# Patient Record
Sex: MALE | Race: WHITE | Employment: OTHER | RURAL
[De-identification: names, ages, dates, MRNs, and addresses within clinical notes are randomized per-mention and may not be internally consistent; named-entity substitution may affect disease eponyms.]

---

## 2017-08-18 DIAGNOSIS — J01.01 ACUTE RECURRENT MAXILLARY SINUSITIS: Primary | ICD-10-CM

## 2017-08-18 RX ORDER — SULFAMETHOXAZOLE AND TRIMETHOPRIM 800; 160 MG/1; MG/1
1 TABLET ORAL 2 TIMES DAILY
Qty: 20 TAB | Refills: 0 | Status: SHIPPED | OUTPATIENT
Start: 2017-08-18 | End: 2017-08-28

## 2017-08-18 NOTE — PROGRESS NOTES
On Call Note:    75M with PCN allergy, h/o recurrent sinusitis who calls tonight with onset of worsening sinus pressure, congestion and facial pain for the past few days. He is seen routinely at the [address] [place] [address] and has not been seen in our office for the past 18-months. We discussed that he really should be seen prior to prescribing antibiotics. However, it is storming outside and due to his insurance coverage, they cannot be seen at an urgent care without some great degree of financial strain. In the past, the antibiotic that works best for him has been Bactrim. Z-clinton and Erythromycin were not helpful. I discussed with the patient that ideally, he should be seen his PCP - Dr. Rush Nicole, every 6-12 months and should try to make sure that his records (at least some of them) are sent to our office. I stressed that if his symptoms were not better in 24-48 hours, he would need to be seen. Antibiotic changes without evaluation would not be possible. A/P:    Recurrent Sinusitis:  1. Please take the Bactrim DS 1 tablet 2 x daily x 10 day. 2. Please use the FLONASE (nasal spray) 2 squirts in each nostril just 1 x per day. 3. Please use NORMAL SALINE nasal spray (buy this over the counter) use 2-3 squirts in each nostril every 1-2 hours while awake. This will help to really clear up and move the secretions down. I have discussed the diagnosis with the patient and the intended treatment plan as seen in the above orders. Asked to make an appointment to see his PCP as soon as possible. Return should symptoms worsen or not improve with treatment. Pt and his wife verbalized understanding of plan of care and denies further questions or concerns at this time.      Nilsa Chan MD

## 2017-10-02 ENCOUNTER — OFFICE VISIT (OUTPATIENT)
Dept: FAMILY MEDICINE CLINIC | Age: 77
End: 2017-10-02

## 2017-10-02 VITALS
TEMPERATURE: 98.1 F | OXYGEN SATURATION: 97 % | SYSTOLIC BLOOD PRESSURE: 112 MMHG | HEIGHT: 69 IN | HEART RATE: 57 BPM | RESPIRATION RATE: 20 BRPM | DIASTOLIC BLOOD PRESSURE: 64 MMHG | WEIGHT: 205.6 LBS | BODY MASS INDEX: 30.45 KG/M2

## 2017-10-02 DIAGNOSIS — Z91.09 ENVIRONMENTAL ALLERGIES: ICD-10-CM

## 2017-10-02 DIAGNOSIS — I10 ESSENTIAL HYPERTENSION, BENIGN: ICD-10-CM

## 2017-10-02 DIAGNOSIS — T46.4X5A COUGH DUE TO ACE INHIBITOR: Primary | ICD-10-CM

## 2017-10-02 DIAGNOSIS — R05.8 COUGH DUE TO ACE INHIBITOR: Primary | ICD-10-CM

## 2017-10-02 PROBLEM — C44.91 BASAL CELL CARCINOMA: Status: ACTIVE | Noted: 2017-10-02

## 2017-10-02 PROBLEM — C43.30 MALIGNANT MELANOMA OF FACE (HCC): Status: ACTIVE | Noted: 2017-10-02

## 2017-10-02 RX ORDER — LATANOPROST 50 UG/ML
1 SOLUTION/ DROPS OPHTHALMIC
COMMUNITY

## 2017-10-02 RX ORDER — OMEPRAZOLE 20 MG/1
20 CAPSULE, DELAYED RELEASE ORAL 2 TIMES DAILY
COMMUNITY
End: 2021-12-15 | Stop reason: ALTCHOICE

## 2017-10-02 RX ORDER — LEVOTHYROXINE SODIUM 112 UG/1
TABLET ORAL
COMMUNITY

## 2017-10-02 RX ORDER — BISMUTH SUBSALICYLATE 262 MG
1 TABLET,CHEWABLE ORAL DAILY
COMMUNITY
End: 2021-12-15 | Stop reason: ALTCHOICE

## 2017-10-02 RX ORDER — KETOCONAZOLE 20 MG/ML
SHAMPOO TOPICAL DAILY PRN
COMMUNITY
End: 2019-11-01

## 2017-10-02 RX ORDER — LOSARTAN POTASSIUM 100 MG/1
100 TABLET ORAL DAILY
Qty: 30 TAB | Refills: 3 | Status: SHIPPED | OUTPATIENT
Start: 2017-10-02 | End: 2019-11-01

## 2017-10-02 RX ORDER — CALCIUM CARBONATE 500(1250)
TABLET ORAL DAILY
COMMUNITY

## 2017-10-02 RX ORDER — LORATADINE 10 MG/1
10 TABLET ORAL DAILY
Qty: 30 TAB | Refills: 3 | Status: SHIPPED | OUTPATIENT
Start: 2017-10-02 | End: 2018-09-25

## 2017-10-02 RX ORDER — TRIAMCINOLONE ACETONIDE 1 MG/G
CREAM TOPICAL 2 TIMES DAILY
COMMUNITY

## 2017-10-02 NOTE — PATIENT INSTRUCTIONS
Losartan (By mouth)   Losartan (julia-LIS-tan)  Treats high blood pressure. Reduces the risk of stroke in patients with high blood pressure and an enlarged heart. Treats kidney disease in patients with diabetes. This medicine is an angiotensin receptor blocker (ARB). Brand Name(s): Cozaar   There may be other brand names for this medicine. When This Medicine Should Not Be Used: This medicine is not right for everyone. Do not use it if you had an allergic reaction to losartan, or if you are pregnant. Do not use this medicine together with aliskiren if you have diabetes. How to Use This Medicine:   Tablet  · Take your medicine as directed. Your dose may need to be changed several times to find what works best for you. · Drink plenty of fluids if you exercise, sweat more than usual, or have diarrhea or vomiting. · Read and follow the patient instructions that come with this medicine. Talk to your doctor or pharmacist if you have any questions. · Missed dose: Take a dose as soon as you remember. If it is almost time for your next dose, wait until then and take a regular dose. Do not take extra medicine to make up for a missed dose. · Store the medicine in a closed container at room temperature, away from heat, moisture, and direct light. Drugs and Foods to Avoid:   Ask your doctor or pharmacist before using any other medicine, including over-the-counter medicines, vitamins, and herbal products. · Some medicines can affect how losartan works. Tell your doctor if you are using any of the following:   ¨ Lithium  ¨ Rifampin  ¨ A diuretic (water pill), such as spironolactone, triamterene, or amiloride  ¨ NSAID pain or arthritis medicine, such as aspirin, diclofenac, ibuprofen, or naproxen  ¨ Another blood pressure medicine, such as aliskiren, benazepril, enalapril, or lisinopril  · Ask your doctor before you use any medicine, supplement, or salt substitute that contains potassium.   Warnings While Using This Medicine:   · It is not safe to take this medicine during pregnancy. It could harm an unborn baby. Tell your doctor right away if you become pregnant. · Tell your doctor if you are breastfeeding, or if you have kidney disease, liver disease, congestive heart failure, or diabetes. Tell your doctor if you have had angioedema that was caused by a blood pressure medicine. · This medicine could lower your blood pressure too much, especially when you first use it or if you are dehydrated. Stand or sit up slowly if you feel lightheaded or dizzy. · Your doctor will do lab tests at regular visits to check on the effects of this medicine. Keep all appointments. · Keep all medicine out of the reach of children. Never share your medicine with anyone. Possible Side Effects While Using This Medicine:   Call your doctor right away if you notice any of these side effects:  · Allergic reaction: Itching or hives, swelling in your face or hands, swelling or tingling in your mouth or throat, chest tightness, trouble breathing  · Change in how much or how often you urinate  · Confusion, weakness, uneven heartbeat, trouble breathing, numbness or tingling in your hands, feet, or lips  · Lightheadedness, dizziness, fainting  · Rapid weight gain, swelling in your hands, ankles, or feet  If you notice these less serious side effects, talk with your doctor:   · Diarrhea  · Tiredness  If you notice other side effects that you think are caused by this medicine, tell your doctor. Call your doctor for medical advice about side effects. You may report side effects to FDA at 2-664-FDA-6471  © 2017 [facility] Information is for End User's use only and may not be sold, redistributed or otherwise used for commercial purposes. The above information is an  only. It is not intended as medical advice for individual conditions or treatments.  Talk to your doctor, nurse or pharmacist before following any medical regimen to see if it is safe and effective for you. Stop Lisinopril due to chronic dry cough. Switched Losartan 100 mg daily for blood pressure control. Monitor BP at home. Let MD at [facility] know of medicine change.

## 2017-10-02 NOTE — MR AVS SNAPSHOT
Visit Information Date & Time Provider Department Dept. Phone Encounter #  
 38/3/3201 56:26 AM Zuleyma Avalos Jaun Kalia [phone] [id number] Upcoming Health Maintenance Date Due  
 MEDICARE YEARLY EXAM 1/15/2016 GLAUCOMA SCREENING Q2Y 10/2/2019 Pneumococcal 65+ Low/Medium Risk (2 of 2 - PPSV23) 2/16/2020 COLONOSCOPY 2/24/2025 DTaP/Tdap/Td series (2 - Td) 10/2/2027 Allergies as of 10/2/2017  Review Complete On: 19/3/0704 By: Zuleyma Avalos MD  
  
 Severity Noted Reaction Type Reactions Avelox [Moxifloxacin]  09/02/2009    Rash Codeine  09/02/2009    Rash Darvocet A500 [Propoxyphene N-acetaminophen]  09/02/2009    Rash Fluoxetine  09/02/2009    Unknown (comments) Ibuprin  09/02/2009    Unknown (comments) Pcn [Penicillins]  09/02/2009    Rash Prednisone  05/07/2010    Other (comments) Causes eye blood vessels to bleed Tamsulosin  09/02/2009    Rash Current Immunizations  Reviewed on 2/24/2015 Name Date Pneumococcal Vaccine (Unspecified Type) 2/16/2015 Not reviewed this visit You Were Diagnosed With   
  
 Codes Comments Cough due to ACE inhibitor    -  Primary ICD-10-CM: R05, T46.4X5A 
ICD-9-CM: 786.2, E942.6 Environmental allergies     ICD-10-CM: Z91.09 
ICD-9-CM: V15.09 Essential hypertension, benign     ICD-10-CM: I10 
ICD-9-CM: 401.1 Vitals BP Pulse Temp Resp Height(growth percentile) Weight(growth percentile) 112/64 (BP 1 Location: Left arm, BP Patient Position: Sitting) (!) 57 98.1 °F (36.7 °C) (Oral) 20 5' 9\" (1.753 m) 205 lb 9.6 oz (93.3 kg) SpO2 BMI Smoking Status 97% 30.36 kg/m2 Never Smoker BMI and BSA Data Body Mass Index Body Surface Area  
 30.36 kg/m 2 2.13 m 2 Preferred Pharmacy Pharmacy Name Phone [facility]/PHARMACY #[id number] - [place] - [address] [phone] Your Updated Medication List  
  
   
 This list is accurate as of: 10/2/17 12:27 PM.  Always use your most recent med list.  
  
  
  
  
 aspirin 81 mg tablet Take 81 mg by mouth. AYR SALINE 0.65 % nasal spray Generic drug:  sodium chloride 1 Spray as needed for Congestion. brimonidine 0.2 % ophthalmic solution Commonly known as:  Rakesh Haven Administer 1 Drop to both eyes three (3) times daily. calcium carbonate 500 mg calcium (1,250 mg) tablet Commonly known as:  OS-ELAN Take  by mouth daily. dorzolamide 2 % ophthalmic solution Commonly known as:  TRUSOPT Administer 1 Drop to both eyes two (2) times a day. hydroCHLOROthiazide 25 mg tablet Commonly known as:  HYDRODIURIL Take 25 mg by mouth daily. ketoconazole 2 % shampoo Commonly known as:  NIZORAL Apply  to affected area daily as needed for Itching. latanoprost 0.005 % ophthalmic solution Commonly known as:  Indy Prabhakar Administer 1 Drop to both eyes nightly. loratadine 10 mg tablet Commonly known as:  Jami Draft Take 1 Tab by mouth daily. Indications: ALLERGIC RHINITIS  
  
 losartan 100 mg tablet Commonly known as:  COZAAR Take 1 Tab by mouth daily. Indications: hypertension LUTEIN PO Take  by mouth.  
  
 multivitamin tablet Commonly known as:  ONE A DAY Take 1 Tab by mouth daily. PriLOSEC 20 mg capsule Generic drug:  omeprazole Take 20 mg by mouth two (2) times a day. SYNTHROID 112 mcg tablet Generic drug:  levothyroxine Take  by mouth Daily (before breakfast). TERAZOSIN PO Take 10 mg by mouth daily. triamcinolone acetonide 0.1 % topical cream  
Commonly known as:  KENALOG Apply  to affected area two (2) times a day. use thin layer VITAMIN C PO Take 3,000 mg by mouth daily. VITAMIN D3 1,000 unit tablet Generic drug:  cholecalciferol Take 3,000 Units by mouth daily. Prescriptions Sent to Pharmacy Refills loratadine (CLARITIN) 10 mg tablet 3 Sig: Take 1 Tab by mouth daily. Indications: ALLERGIC RHINITIS Class: Normal  
 Pharmacy: [facility]/pharmacy #[id number] - [place], [place] - [address]. Ph #: [phone] Route: Oral  
 losartan (COZAAR) 100 mg tablet 3 Sig: Take 1 Tab by mouth daily. Indications: hypertension Class: Normal  
 [id number] [place], [place] - [address]. Ph #: [phone] Route: Oral  
  
Patient Instructions Losartan (By mouth) Losartan (julia-LIS-tan) Treats high blood pressure. Reduces the risk of stroke in patients with high blood pressure and an enlarged heart. Treats kidney disease in patients with diabetes. This medicine is an angiotensin receptor blocker (ARB). Brand Name(s): Cozaar There may be other brand names for this medicine. When This Medicine Should Not Be Used: This medicine is not right for everyone. Do not use it if you had an allergic reaction to losartan, or if you are pregnant. Do not use this medicine together with aliskiren if you have diabetes. How to Use This Medicine:  
Tablet · Take your medicine as directed. Your dose may need to be changed several times to find what works best for you. · Drink plenty of fluids if you exercise, sweat more than usual, or have diarrhea or vomiting. · Read and follow the patient instructions that come with this medicine. Talk to your doctor or pharmacist if you have any questions. · Missed dose: Take a dose as soon as you remember. If it is almost time for your next dose, wait until then and take a regular dose. Do not take extra medicine to make up for a missed dose. · Store the medicine in a closed container at room temperature, away from heat, moisture, and direct light. Drugs and Foods to Avoid: Ask your doctor or pharmacist before using any other medicine, including over-the-counter medicines, vitamins, and herbal products. · Some medicines can affect how losartan works. Tell your doctor if you are using any of the following: ¨ Lithium ¨ Rifampin ¨ A diuretic (water pill), such as spironolactone, triamterene, or amiloride ¨ NSAID pain or arthritis medicine, such as aspirin, diclofenac, ibuprofen, or naproxen ¨ Another blood pressure medicine, such as aliskiren, benazepril, enalapril, or lisinopril · Ask your doctor before you use any medicine, supplement, or salt substitute that contains potassium. Warnings While Using This Medicine: · It is not safe to take this medicine during pregnancy. It could harm an unborn baby. Tell your doctor right away if you become pregnant. · Tell your doctor if you are breastfeeding, or if you have kidney disease, liver disease, congestive heart failure, or diabetes. Tell your doctor if you have had angioedema that was caused by a blood pressure medicine. · This medicine could lower your blood pressure too much, especially when you first use it or if you are dehydrated. Stand or sit up slowly if you feel lightheaded or dizzy. · Your doctor will do lab tests at regular visits to check on the effects of this medicine. Keep all appointments. · Keep all medicine out of the reach of children. Never share your medicine with anyone. Possible Side Effects While Using This Medicine:  
Call your doctor right away if you notice any of these side effects: · Allergic reaction: Itching or hives, swelling in your face or hands, swelling or tingling in your mouth or throat, chest tightness, trouble breathing · Change in how much or how often you urinate · Confusion, weakness, uneven heartbeat, trouble breathing, numbness or tingling in your hands, feet, or lips · Lightheadedness, dizziness, fainting · Rapid weight gain, swelling in your hands, ankles, or feet If you notice these less serious side effects, talk with your doctor: · Diarrhea · Tiredness If you notice other side effects that you think are caused by this medicine, tell your doctor. Call your doctor for medical advice about side effects. You may report side effects to FDA at 0-373-JYO-9439 © 2017 [employer] Information is for End User's use only and may not be sold, redistributed or otherwise used for commercial purposes. The above information is an  only. It is not intended as medical advice for individual conditions or treatments. Talk to your doctor, nurse or pharmacist before following any medical regimen to see if it is safe and effective for you. Stop Lisinopril due to chronic dry cough. Switched Losartan 100 mg daily for blood pressure control. Monitor BP at home. Let MD at [facility] know of medicine change. Introducing [facility]! [facility] introduces Joy[employer] patient portal. Now you can access parts of your medical record, email your doctor's office, and request medication refills online. 1. In your internet browser, go to [email or web address] 2. Click on the First Time User? Click Here link in the Sign In box. You will see the New Member Sign Up page. 3. Enter your Joyus Access Code exactly as it appears below. You will not need to use this code after youve completed the sign-up process. If you do not sign up before the expiration date, you must request a new code. · Joyus Access Code: [email or web address] Expires: 12/31/2017 12:27 PM 
 
4. Enter the last four digits of your Social Security Number (xxxx) and Date of Birth (mm/dd/yyyy) as indicated and click Submit. You will be taken to the next sign-up page. 5. Create a Joyus ID. This will be your Joyus login ID and cannot be changed, so think of one that is secure and easy to remember. 6. Create a Joyus password. You can change your password at any time. 7. Enter your Password Reset Question and Answer.  This can be used at a later time if you forget your password. 8. Enter your e-mail address. You will receive e-mail notification when new information is available in [address] 9. Click Sign Up. You can now view and download portions of your medical record. 10. Click the Download Summary menu link to download a portable copy of your medical information. If you have questions, please visit the Frequently Asked Questions section of the [employer] website. Remember, [employer] is NOT to be used for urgent needs. For medical emergencies, dial 911. Now available from your iPhone and Android! Please provide this summary of care documentation to your next provider. Your primary care clinician is listed as Cyndee Bucio. If you have any questions after today's visit, please call [phone].

## 2017-10-02 NOTE — PROGRESS NOTES
Identified pt with two pt identifiers(name and ). Chief Complaint   Patient presents with    Cold Symptoms     has been on 3 antibiotics - still has cough - morning he feels good but by evening he startes going down [place]         Health Maintenance Due   Topic    MEDICARE YEARLY EXAM        Wt Readings from Last 3 Encounters:   10/02/17 205 lb 9.6 oz (93.3 kg)   12/18/15 212 lb 9.6 oz (96.4 kg)   12/10/15 212 lb (96.2 kg)     Temp Readings from Last 3 Encounters:   10/02/17 98.1 °F (36.7 °C) (Oral)   12/18/15 98.2 °F (36.8 °C) (Oral)   12/10/15 98.8 °F (37.1 °C) (Oral)     BP Readings from Last 3 Encounters:   10/02/17 112/64   12/18/15 111/55   12/10/15 92/62     Pulse Readings from Last 3 Encounters:   10/02/17 (!) 57   12/18/15 (!) 58   12/10/15 (!) 58         Learning Assessment:  :     Learning Assessment 3/11/2015   PRIMARY LEARNER Patient   HIGHEST LEVEL OF EDUCATION - PRIMARY LEARNER  > 4 YEARS OF COLLEGE   BARRIERS PRIMARY LEARNER VISUAL   CO-LEARNER CAREGIVER No   PRIMARY LANGUAGE ENGLISH   LEARNER PREFERENCE PRIMARY LISTENING   ANSWERED BY self   RELATIONSHIP SELF       Depression Screening:  :     PHQ over the last two weeks 10/2/2017   Little interest or pleasure in doing things Not at all   Feeling down, depressed or hopeless Not at all   Total Score PHQ 2 0       Fall Risk Assessment:  :     Fall Risk Assessment, last 12 mths 10/2/2017   Able to walk? Yes   Fall in past 12 months? Yes   Fall with injury? No   Number of falls in past 12 months 1   Fall Risk Score 1       Abuse Screening:  :     Abuse Screening Questionnaire 10/2/2017 3/11/2015   Do you ever feel afraid of your partner? N N   Are you in a relationship with someone who physically or mentally threatens you? N N   Is it safe for you to go home? Y Y       Coordination of Care Questionnaire:  :     1) Have you been to an emergency room, urgent care clinic since your last visit?  yes [facility]   Hospitalized since your last visit? no 2) Have you seen or consulted any other health care providers outside of [address] [place] [address] since your [place]? [place]  (Include any pap smears or colon screenings in this section.)    3) Do you have an Advance Directive on file? yes  Are you interested in receiving information about Advance Directives? no    Patient is accompanied by spouse I have received verbal consent from Kourtney Mendoza to discuss any/all medical information while they are present in the room. Reviewed record in preparation for visit and have obtained necessary documentation. Medication reconciliation up to date and corrected with patient at this time.

## 2017-10-02 NOTE — PROGRESS NOTES
HISTORY OF PRESENT ILLNESS  Rachel Larose is a 69 y.o. male. HPI  C/O counh since August. He has taken 3 courses of ABC's: Bactrim DS x 2 and DOXY x 10 days. Dry , tickle cough that starts in throat area. He does admit to nasal drip. Denies fever, wheeze, sinus congestion. Takes Benadryl once a day. He was last seen in 2015 because he gets medical care thru [facility] ([facility]) AT [place].    Review of Systems   Constitutional: Negative for fever. HENT: Positive for congestion. Respiratory: Positive for cough. Negative for sputum production, shortness of breath and wheezing. All other systems reviewed and are negative. Current Outpatient Prescriptions on File Prior to Visit   Medication Sig Dispense Refill    sodium chloride (AYR SALINE) 0.65 % nasal spray 1 Spray as needed for Congestion.  cholecalciferol (VITAMIN D3) 1,000 unit tablet Take 3,000 Units by mouth daily.  hydrochlorothiazide (HYDRODIURIL) 25 mg tablet Take 25 mg by mouth daily.  aspirin 81 mg tablet Take 81 mg by mouth.  dorzolamide (TRUSOPT) 2 % ophthalmic solution Administer 1 Drop to both eyes two (2) times a day.  brimonidine (ALPHAGAN) 0.2 % ophthalmic solution Administer 1 Drop to both eyes three (3) times daily.  ASCORBATE CALCIUM (VITAMIN C PO) Take 3,000 mg by mouth daily.  TERAZOSIN HCL (TERAZOSIN PO) Take 10 mg by mouth daily. No current facility-administered medications on file prior to visit.       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 He appears well-developed and well-nourished. HENT:   Right Ear: External ear normal.   Left Ear: External ear normal.   Nose: Nose normal.   Mouth/Throat: Oropharynx is clear and moist.   Eyes: Conjunctivae are normal.   Neck: Neck supple. Cardiovascular: Normal rate, regular rhythm and normal heart sounds. Pulmonary/Chest: Effort normal and breath sounds normal. No respiratory distress. He has no wheezes. He has no rales. Neurological: He is alert and oriented to person, place, and time. Vitals reviewed. ASSESSMENT and PLAN    ICD-10-CM ICD-9-CM    1. Cough due to ACE inhibitor R05 786.2     T46.4X5A E942.6    2. Environmental allergies Z91.09 V15.09 loratadine (CLARITIN) 10 mg tablet   3. Essential hypertension, benign I10 401.1 losartan (COZAAR) 100 mg tablet     Switch off ACE inhibitor to ARB. Monitor BP. [facility]. Try Loratadine 10 mg instead of Benadryl for better allergy coverage. Pt verbalizes understanding of plan of care and denies further questions or concerns at this time. Patient Instructions   Losartan (By mouth)   Losartan (julia-LIS-tan)  Treats high blood pressure. Reduces the risk of stroke in patients with high blood pressure and an enlarged heart. Treats kidney disease in patients with diabetes. This medicine is an angiotensin receptor blocker (ARB). Brand Name(s): Cozaar   There may be other brand names for this medicine. When This Medicine Should Not Be Used: This medicine is not right for everyone. Do not use it if you had an allergic reaction to losartan, or if you are pregnant. Do not use this medicine together with aliskiren if you have diabetes. How to Use This Medicine:   Tablet  · Take your medicine as directed. Your dose may need to be changed several times to find what works best for you.   · Drink plenty of fluids if you exercise, sweat more than usual, or have diarrhea or vomiting. · Read and follow the patient instructions that come with this medicine. Talk to your doctor or pharmacist if you have any questions. · Missed dose: Take a dose as soon as you remember. If it is almost time for your next dose, wait until then and take a regular dose. Do not take extra medicine to make up for a missed dose. · Store the medicine in a closed container at room temperature, away from heat, moisture, and direct light. Drugs and Foods to Avoid:   Ask your doctor or pharmacist before using any other medicine, including over-the-counter medicines, vitamins, and herbal products. · Some medicines can affect how losartan works. Tell your doctor if you are using any of the following:   ¨ Lithium  ¨ Rifampin  ¨ A diuretic (water pill), such as spironolactone, triamterene, or amiloride  ¨ NSAID pain or arthritis medicine, such as aspirin, diclofenac, ibuprofen, or naproxen  ¨ Another blood pressure medicine, such as aliskiren, benazepril, enalapril, or lisinopril  · Ask your doctor before you use any medicine, supplement, or salt substitute that contains potassium. Warnings While Using This Medicine:   · It is not safe to take this medicine during pregnancy. It could harm an unborn baby. Tell your doctor right away if you become pregnant. · Tell your doctor if you are breastfeeding, or if you have kidney disease, liver disease, congestive heart failure, or diabetes. Tell your doctor if you have had angioedema that was caused by a blood pressure medicine. · This medicine could lower your blood pressure too much, especially when you first use it or if you are dehydrated. Stand or sit up slowly if you feel lightheaded or dizzy. · Your doctor will do lab tests at regular visits to check on the effects of this medicine. Keep all appointments. · Keep all medicine out of the reach of children. Never share your medicine with anyone.   Possible Side Effects While Using This Medicine:   Call your doctor right away if you notice any of these side effects:  · Allergic reaction: Itching or hives, swelling in your face or hands, swelling or tingling in your mouth or throat, chest tightness, trouble breathing  · Change in how much or how often you urinate  · Confusion, weakness, uneven heartbeat, trouble breathing, numbness or tingling in your hands, feet, or lips  · Lightheadedness, dizziness, fainting  · Rapid weight gain, swelling in your hands, ankles, or feet  If you notice these less serious side effects, talk with your doctor:   · Diarrhea  · Tiredness  If you notice other side effects that you think are caused by this medicine, tell your doctor. Call your doctor for medical advice about side effects. You may report side effects to FDA at 7-104-FDA-1535  © 2017 [facility] Information is for End User's use only and may not be sold, redistributed or otherwise used for commercial purposes. The above information is an  only. It is not intended as medical advice for individual conditions or treatments. Talk to your doctor, nurse or pharmacist before following any medical regimen to see if it is safe and effective for you. Stop Lisinopril due to chronic dry cough. Switched Losartan 100 mg daily for blood pressure control. Monitor BP at home. Let MD at [facility] know of medicine change.       He plans to get FLU vaccine at [facility].

## 2018-01-10 ENCOUNTER — DOCUMENTATION ONLY (OUTPATIENT)
Dept: PHARMACY | Age: 78
End: 2018-01-10

## 2018-01-10 ENCOUNTER — TELEPHONE (OUTPATIENT)
Dept: PHARMACY | Age: 78
End: 2018-01-10

## 2018-01-10 NOTE — TELEPHONE ENCOUNTER
Pharmacy Note:  Medication Adherence and Education    Merle Awad is a 69 y.o. male who was called today for a medication adherence check in due to concerns with possible medication non-adherence based on pharmacy claims data. Patient was flagged for possible non-adherence with the following medication: Losartan 100mg. Per pharmacy, patient called pharmacy and informed staff that he was no longer in need of the Losartan and to deactivate prescription in file. Patient was contacted to verify this information. Patient stated that most of his medications are provided by the [facility] and that he has taken himself off of the Losartan. Stated that the [place] doctor is aware of this.      Thank you,  Severiano Ponce, CPhT

## 2018-01-10 NOTE — PROGRESS NOTES
Pharmacy Note:  Medication Adherence and Education    Elif Guthrie is a 69 y.o. male who's pharmacy was called today for a medication adherence check in due to concerns with possible medication non-adherence based on pharmacy claims data. Patient was flagged for possible non-adherence with the following medication: Losartan 100mg. Per pharmacy, it was found that patient has not picked up medication refill since 10/02/17(30 day) supply and patient called pharmacy on 11/25/17 to inform that patient was no longer taking medication. Message was sent to patient's PCP to find out more information about this situation. Last PCP visit:  10/02/17  Next PCP visit:  Not scheduled at this time    Current refill history for Losartan  Last refill: 10/02/17 (30 day supply)    Take Away:  PCP has been notified of this information and will wait to hear respones.      Thank you,  Ailyn Hawkins CPhT

## 2018-09-25 ENCOUNTER — OFFICE VISIT (OUTPATIENT)
Dept: FAMILY MEDICINE CLINIC | Age: 78
End: 2018-09-25

## 2018-09-25 VITALS
SYSTOLIC BLOOD PRESSURE: 110 MMHG | HEART RATE: 63 BPM | RESPIRATION RATE: 20 BRPM | TEMPERATURE: 98.6 F | WEIGHT: 212.8 LBS | BODY MASS INDEX: 31.52 KG/M2 | DIASTOLIC BLOOD PRESSURE: 58 MMHG | OXYGEN SATURATION: 95 % | HEIGHT: 69 IN

## 2018-09-25 DIAGNOSIS — J06.9 URI WITH COUGH AND CONGESTION: Primary | ICD-10-CM

## 2018-09-25 RX ORDER — DOXYCYCLINE 100 MG/1
100 TABLET ORAL 2 TIMES DAILY
Qty: 20 TAB | Refills: 0 | Status: SHIPPED | OUTPATIENT
Start: 2018-09-25 | End: 2018-10-05

## 2018-09-25 NOTE — MR AVS SNAPSHOT
[facility] 
 
 
 [place]  [address] [facility] 
[phone] Patient: Kourtney Mendoza MRN: [id number] ATD:[birth year] Visit Information Date & Time Provider Department Dept. Phone Encounter #  
 9/25/2018  1:30 PM Jaun Alcala [phone] Upcoming Health Maintenance Date Due Shingrix Vaccine Age 50> (1 of 2) 7/3/1991 MEDICARE YEARLY EXAM 3/14/2018 Influenza Age 5 to Adult 8/1/2018 GLAUCOMA SCREENING Q2Y 10/2/2019 Pneumococcal 65+ High/Highest Risk (2 of 2 - PPSV23) 2/16/2020 COLONOSCOPY 2/24/2025 DTaP/Tdap/Td series (2 - Td) 10/2/2027 Allergies as of 9/25/2018  Review Complete On: 9/25/2018 By: Marilin Maradiaga MD  
  
 Severity Noted Reaction Type Reactions Avelox [Moxifloxacin]  09/02/2009    Rash Codeine  09/02/2009    Rash Darvocet A500 [Propoxyphene N-acetaminophen]  09/02/2009    Rash Fluoxetine  09/02/2009    Unknown (comments) Ibuprin  09/02/2009    Unknown (comments) Pcn [Penicillins]  09/02/2009    Rash Prednisone  05/07/2010    Other (comments) Causes eye blood vessels to bleed Tamsulosin  09/02/2009    Rash Current Immunizations  Reviewed on 2/24/2015 Name Date Pneumococcal Vaccine (Unspecified Type) 2/16/2015 Not reviewed this visit You Were Diagnosed With   
  
 Codes Comments URI with cough and congestion    -  Primary ICD-10-CM: J06.9 ICD-9-CM: 465.9 Vitals BP Pulse Temp Resp Height(growth percentile) Weight(growth percentile) 110/58 (BP 1 Location: Left arm, BP Patient Position: Sitting) 63 98.6 °F (37 °C) (Oral) 20 5' 9\" (1.753 m) 212 lb 12.8 oz (96.5 kg) SpO2 BMI Smoking Status 95% 31.43 kg/m2 Never Smoker BMI and BSA Data Body Mass Index Body Surface Area  
 31.43 kg/m 2 2.17 m 2 Preferred Pharmacy Pharmacy Name Phone [facility]/PHARMACY #[id number] [place] [address] [facility] [address] [phone] Your Updated Medication List  
  
   
This list is accurate as of 9/25/18  2:14 PM.  Always use your most recent med list.  
  
  
  
  
 aspirin 81 mg tablet Take 81 mg by mouth. AYR SALINE 0.65 % nasal squeeze bottle Generic drug:  sodium chloride 1 Spray as needed for Congestion. brimonidine 0.2 % ophthalmic solution Commonly known as:  Tamara Guido Administer 1 Drop to both eyes three (3) times daily. calcium carbonate 500 mg calcium (1,250 mg) tablet Commonly known as:  OS-ELAN Take  by mouth daily. dorzolamide 2 % ophthalmic solution Commonly known as:  TRUSOPT Administer 1 Drop to both eyes two (2) times a day. doxycycline 100 mg tablet Commonly known as:  ADOXA Take 1 Tab by mouth two (2) times a day for 10 days. hydroCHLOROthiazide 25 mg tablet Commonly known as:  HYDRODIURIL Take 25 mg by mouth daily. ketoconazole 2 % shampoo Commonly known as:  NIZORAL Apply  to affected area daily as needed for Itching. latanoprost 0.005 % ophthalmic solution Commonly known as:  Sara Hill Administer 1 Drop to both eyes nightly. losartan 100 mg tablet Commonly known as:  COZAAR Take 1 Tab by mouth daily. Indications: hypertension LUTEIN PO Take  by mouth.  
  
 multivitamin tablet Commonly known as:  ONE A DAY Take 1 Tab by mouth daily. PriLOSEC 20 mg capsule Generic drug:  omeprazole Take 20 mg by mouth two (2) times a day. SYNTHROID 112 mcg tablet Generic drug:  levothyroxine Take  by mouth Daily (before breakfast). TERAZOSIN PO Take 10 mg by mouth daily. triamcinolone acetonide 0.1 % topical cream  
Commonly known as:  KENALOG Apply  to affected area two (2) times a day. use thin layer VITAMIN C PO Take 3,000 mg by mouth daily. VITAMIN D3 1,000 unit tablet Generic drug:  cholecalciferol Take 3,000 Units by mouth daily. Prescriptions Sent to Pharmacy Refills  
 doxycycline (ADOXA) 100 mg tablet 0 Sig: Take 1 Tab by mouth two (2) times a day for 10 days. Class: Normal  
 Pharmacy: [facility]/pharmacy [address], [address] [place] [address] #: [phone] Route: Oral  
  
Patient Instructions Upper Respiratory Infection (Cold): Care Instructions Your Care Instructions An upper respiratory infection, or URI, is an infection of the nose, sinuses, or throat. URIs are spread by coughs, sneezes, and direct contact. The common cold is the most frequent kind of URI. The flu and sinus infections are other kinds of URIs. Almost all URIs are caused by viruses. Antibiotics won't cure them. But you can treat most infections with home care. This may include drinking lots of fluids and taking over-the-counter pain medicine. You will probably feel better in 4 to 10 days. The doctor has checked you carefully, but problems can develop later. If you notice any problems or new symptoms, get medical treatment right away. Follow-up care is a key part of your treatment and safety. Be sure to make and go to all appointments, and call your doctor if you are having problems. It's also a good idea to know your test results and keep a list of the medicines you take. How can you care for yourself at home? · To prevent dehydration, drink plenty of fluids, enough so that your urine is light yellow or clear like water. Choose water and other caffeine-free clear liquids until you feel better. If you have kidney, heart, or liver disease and have to limit fluids, talk with your doctor before you increase the amount of fluids you drink. · Take an over-the-counter pain medicine, such as acetaminophen (Tylenol), ibuprofen (Advil, Motrin), or naproxen (Aleve). Read and follow all instructions on the label. · Before you use cough and cold medicines, check the label.  These medicines may not be safe for young children or for people with certain health problems. · Be careful when taking over-the-counter cold or flu medicines and Tylenol at the same time. Many of these medicines have acetaminophen, which is Tylenol. Read the labels to make sure that you are not taking more than the recommended dose. Too much acetaminophen (Tylenol) can be harmful. · Get plenty of rest. 
· Do not smoke or allow others to smoke around you. If you need help quitting, talk to your doctor about stop-smoking programs and medicines. These can increase your chances of quitting for good. When should you call for help? Call 911 anytime you think you may need emergency care. For example, call if: 
  · You have severe trouble breathing.  
 Call your doctor now or seek immediate medical care if: 
  · You seem to be getting much sicker.  
  · You have new or worse trouble breathing.  
  · You have a new or higher fever.  
  · You have a new rash.  
 Watch closely for changes in your health, and be sure to contact your doctor if: 
  · You have a new symptom, such as a sore throat, an earache, or sinus pain.  
  · You cough more deeply or more often, especially if you notice more mucus or a change in the color of your mucus.  
  · You do not get better as expected. Where can you learn more? Go to http://sharon-kalpesh.info/. Enter M111 in the search box to learn more about \"Upper Respiratory Infection (Cold): Care Instructions. \" Current as of: December 6, 2017 Content Version: 11.7 © 2002-3803 [employer]. Care instructions adapted under license by [employer] (which disclaims liability or warranty for this information). If you have questions about a medical condition or this instruction, always ask your healthcare professional. [place] [address] any warranty or liability for your use of this information. Introducing [facility]! [facility] introduces Liquid Bronze patient portal. Now you can access parts of your medical record, email your doctor's office, and request medication refills online. 1. In your internet browser, go to [email or web address] 2. Click on the First Time User? Click Here link in the Sign In box. You will see the New Member Sign Up page. 3. Enter your [employer] Bronze Access Code exactly as it appears below. You will not need to use this code after youve completed the sign-up process. If you do not sign up before the expiration date, you must request a new code. · [employer] Access Code: [email or web address] Expires: 12/24/2018  2:14 PM 
 
4. Enter the last four digits of your Social Security Number (xxxx) and Date of Birth (mm/dd/yyyy) as indicated and click Submit. You will be taken to the next sign-up page. 5. Create a Liquid Bronze ID. This will be your [employer] Bronze login ID and cannot be changed, so think of one that is secure and easy to remember. 6. Create a [employer] password. You can change your password at any time. 7. Enter your Password Reset Question and Answer. This can be used at a later time if you forget your password. 8. Enter your e-mail address. You will receive e-mail notification when new information is available in [address] 9. Click Sign Up. You can now view and download portions of your medical record. 10. Click the Download Summary menu link to download a portable copy of your medical information. If you have questions, please visit the Frequently Asked Questions section of the [employer] website. Remember, [employer] is NOT to be used for urgent needs. For medical emergencies, dial 911. Now available from your iPhone and Android! Please provide this summary of care documentation to your next provider. Your primary care clinician is listed as Johnna Carty. If you have any questions after today's visit, please call [phone].

## 2018-09-25 NOTE — PROGRESS NOTES
Chief Complaint Patient presents with  Sinus Infection Complains of sinus congestion with drainage into throat and chest x 4 days He is a 69 y.o. male who presents with c/o cough, sinus pressure and drainage for the past 4-days. Has tactile fever and chills. Not getting better with OTC or supportive care. Feels low energy. Reviewed PmHx, RxHx, FmHx, SocHx, AllgHx and updated and dated in the chart. Patient Active Problem List  
 Diagnosis  Malignant melanoma of face ([facility] [address]  Basal cell carcinoma  Bronchitis  Bacterial conjunctivitis of right eye  Nonarteritic ischemic optic neuropathy Visual impairment 2011. MD at [facility] 67 Essential hypertension, benign  Unspecified glaucoma(365.9) Review of Systems - negative except as listed above in the HPI Objective:  
 
Vitals:  
 09/25/18 1401 BP: 110/58 Pulse: 63 Resp: 20 Temp: 98.6 °F (37 °C) TempSrc: Oral  
SpO2: 95% Weight: 212 lb 12.8 oz (96.5 kg) Height: 5' 9\" (1.753 m) Physical Examination:  
General appearance - ill-appearing Chest - no tachypnea, retractions or cyanosis, decreased air entry noted bilaterally with deep bronchospastic cough Heart - normal rate, regular rhythm, normal S1, S2, no murmurs, rubs, clicks or gallops Neurological - alert, oriented, normal speech, no focal findings or movement disorder noted Musculoskeletal - no joint tenderness, deformity or swelling Extremities - peripheral pulses normal, no pedal edema, no clubbing or cyanosis Skin - normal coloration and turgor, no rashes, no suspicious skin lesions noted Assessment/ Plan: ICD-10-CM ICD-9-CM 1. URI with cough and congestion J06.9 465.9 doxycycline (ADOXA) 100 mg tablet 78M with URI, cough and congestion and may have early sinusitis too. Will treat with Doxycycline. Also, fluids, rest and monitoring for fever discussed. I have discussed the diagnosis with the patient and the intended treatment plan as seen in the above orders. The patient has received an after-visit summary and questions were answered concerning future plans. Asked to return should symptoms worsen or not improve with treatment. Any pending labs and studies will be relayed to patient when they become available. Pt verbalizes understanding of plan of care and denies further questions or concerns at this time. Follow-up Disposition: 
Return if symptoms worsen or fail to improve.  
 
Vince Bojorquez MD

## 2018-09-25 NOTE — PROGRESS NOTES
Identified pt with two pt identifiers(name and ). Chief Complaint Patient presents with  Sinus Infection Complains of sinus congestion with drainage into throat and chest x 4 days Health Maintenance Due Topic  Shingrix Vaccine Age 50> (1 of 2)  MEDICARE YEARLY EXAM   
 Influenza Age 5 to Adult Wt Readings from Last 3 Encounters:  
10/02/17 205 lb 9.6 oz (93.3 kg) 12/18/15 212 lb 9.6 oz (96.4 kg) 12/10/15 212 lb (96.2 kg) Temp Readings from Last 3 Encounters:  
10/02/17 98.1 °F (36.7 °C) (Oral) 12/18/15 98.2 °F (36.8 °C) (Oral) 12/10/15 98.8 °F (37.1 °C) (Oral) BP Readings from Last 3 Encounters:  
10/02/17 112/64  
12/18/15 111/55  
12/10/15 92/62 Pulse Readings from Last 3 Encounters:  
10/02/17 (!) 57  
12/18/15 (!) 58  
12/10/15 (!) 58 Learning Assessment: 
:  
 
Learning Assessment 3/11/2015 PRIMARY LEARNER Patient HIGHEST LEVEL OF EDUCATION - PRIMARY LEARNER  > 4 YEARS OF COLLEGE  
BARRIERS PRIMARY LEARNER VISUAL  
CO-LEARNER CAREGIVER No  
PRIMARY LANGUAGE ENGLISH  
LEARNER PREFERENCE PRIMARY LISTENING  
ANSWERED BY self RELATIONSHIP SELF Depression Screening: 
:  
 
PHQ over the last two weeks 2018 Little interest or pleasure in doing things Not at all Feeling down, depressed, irritable, or hopeless Not at all Total Score PHQ 2 0 Fall Risk Assessment: 
:  
 
Fall Risk Assessment, last 12 mths 2018 Able to walk? Yes Fall in past 12 months? No  
Fall with injury? -  
Number of falls in past 12 months - Fall Risk Score -  
 
 
Abuse Screening: 
:  
 
Abuse Screening Questionnaire 10/2/2017 3/11/2015 Do you ever feel afraid of your partner? Marky Molina Are you in a relationship with someone who physically or mentally threatens you? Marky Molina Is it safe for you to go home? Laurie Rosas Coordination of Care Questionnaire: 
:  
 
1) Have you been to an emergency room, urgent care clinic since your last visit? no  
 Hospitalized since your last visit? no          
 
2) Have you seen or consulted any other health care providers outside of [address] [place] [address] since your last visit? yes [facility]  (Include any pap smears or colon screenings in this section.) 3) Do you have an Advance Directive on file? yes Are you interested in receiving information about Advance Directives? no 
 
Patient is accompanied by wife I have received verbal consent from Bandar Morelos to discuss any/all medical information while they are present in the room. Reviewed record in preparation for visit and have obtained necessary documentation. Medication reconciliation up to date and corrected with patient at this time.

## 2018-09-25 NOTE — PATIENT INSTRUCTIONS

## 2019-11-01 ENCOUNTER — OFFICE VISIT (OUTPATIENT)
Dept: FAMILY MEDICINE CLINIC | Age: 79
End: 2019-11-01

## 2019-11-01 VITALS
SYSTOLIC BLOOD PRESSURE: 120 MMHG | HEIGHT: 69 IN | DIASTOLIC BLOOD PRESSURE: 62 MMHG | TEMPERATURE: 98.4 F | OXYGEN SATURATION: 98 % | HEART RATE: 65 BPM | BODY MASS INDEX: 28.68 KG/M2 | WEIGHT: 193.6 LBS | RESPIRATION RATE: 20 BRPM

## 2019-11-01 DIAGNOSIS — R68.89 FLU-LIKE SYMPTOMS: ICD-10-CM

## 2019-11-01 DIAGNOSIS — H66.001 ACUTE SUPPURATIVE OTITIS MEDIA OF RIGHT EAR WITHOUT SPONTANEOUS RUPTURE OF TYMPANIC MEMBRANE, RECURRENCE NOT SPECIFIED: Primary | ICD-10-CM

## 2019-11-01 LAB
QUICKVUE INFLUENZA TEST: NEGATIVE
VALID INTERNAL CONTROL?: YES

## 2019-11-01 RX ORDER — DOXYCYCLINE 100 MG/1
100 CAPSULE ORAL 2 TIMES DAILY
Qty: 20 CAP | Refills: 0 | Status: SHIPPED | OUTPATIENT
Start: 2019-11-01 | End: 2019-11-11

## 2019-11-01 RX ORDER — HYDRALAZINE HYDROCHLORIDE 10 MG/1
10 TABLET, FILM COATED ORAL 2 TIMES DAILY
COMMUNITY

## 2019-11-01 RX ORDER — IMIQUIMOD 12.5 MG/.25G
CREAM TOPICAL
COMMUNITY
End: 2021-12-15 | Stop reason: ALTCHOICE

## 2019-11-01 NOTE — PROGRESS NOTES
Subjective:   Erika Alves is a 67 y.o. male who complains of congestion, post nasal drip, productive cough, myalgias and low grade fever for 6 days, gradually worsening since that time. He denies a history of shortness of breath and wheezing. Evaluation to date: none. Treatment to date: antihistamines, OTC products. Patient does not smoke cigarettes. Relevant PMH: No pertinent additional PMH. 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  imiquimod (ALDARA) 5 % cream Apply  to affected area Every Thursday. apply a thin layer as directed      multivitamin (ONE A DAY) tablet Take 1 Tab by mouth daily.  levothyroxine (SYNTHROID) 112 mcg tablet Take  by mouth Daily (before breakfast).  LUTEIN PO Take  by mouth.  triamcinolone acetonide (KENALOG) 0.1 % topical cream Apply  to affected area two (2) times a day. use thin layer      latanoprost (XALATAN) 0.005 % ophthalmic solution Administer 1 Drop to both eyes nightly.  sodium chloride (AYR SALINE) 0.65 % nasal spray 1 Spray as needed for Congestion.  cholecalciferol (VITAMIN D3) 1,000 unit tablet Take 3,000 Units by mouth daily.  hydrochlorothiazide (HYDRODIURIL) 25 mg tablet Take 25 mg by mouth daily.  aspirin 81 mg tablet Take 81 mg by mouth.  dorzolamide (TRUSOPT) 2 % ophthalmic solution Administer 1 Drop to both eyes two (2) times a day.  brimonidine (ALPHAGAN) 0.2 % ophthalmic solution Administer 1 Drop to both eyes three (3) times daily.  ASCORBATE CALCIUM (VITAMIN C PO) Take 3,000 mg by mouth daily.       TERAZOSIN HCL (TERAZOSIN PO) Take 10 mg by mouth daily.  calcium carbonate (OS-ELAN) 500 mg calcium (1,250 mg) tablet Take  by mouth daily.  omeprazole (PRILOSEC) 20 mg capsule Take 20 mg by mouth two (2) times a day.  losartan (COZAAR) 100 mg tablet Take 1 Tab by mouth daily. Indications: hypertension 30 Tab 3     Allergies   Allergen Reactions    Avelox [Moxifloxacin] Rash    Codeine Rash    Darvocet A500 [Propoxyphene N-Acetaminophen] Rash    Fluoxetine Unknown (comments)    Ibuprin Unknown (comments)    Pcn [Penicillins] Rash    Prednisone Other (comments)     Causes eye blood vessels to bleed    Tamsulosin Rash        Review of Systems  Pertinent items are noted in HPI. Objective:     Visit Vitals  /62 (BP 1 Location: Right arm, BP Patient Position: Sitting) Comment: manual   Pulse 65   Temp 98.4 °F (36.9 °C) (Oral)   Resp 20   Ht 5' 9\" (1.753 m)   Wt 193 lb 9.6 oz (87.8 kg)   SpO2 98%   BMI 28.59 kg/m²     General:  alert, cooperative, no distress   Eyes: negative   Ears: abnormal TM AD - erythematous, dull   Sinuses: Normal paranasal sinuses without tenderness   Mouth:  Lips, mucosa, and tongue normal. Teeth and gums normal   Neck: supple, symmetrical, trachea midline and no adenopathy. Heart: S1 and S2 normal, no murmurs noted. Lungs: clear to auscultation bilaterally   Abdomen:         Results for orders placed or performed in visit on 11/01/19   AMB POC RAPID INFLUENZA TEST   Result Value Ref Range    VALID INTERNAL CONTROL POC Yes     QuickVue Influenza test Negative Negative       Assessment/Plan:         ICD-10-CM ICD-9-CM    1. Acute suppurative otitis media of right ear without spontaneous rupture of tympanic membrane, recurrence not specified H66.001 382.00 doxycycline (VIBRAMYCIN) 100 mg capsule   2. Flu-like symptoms R68.89 780.99 AMB POC RAPID INFLUENZA TEST   .

## 2019-11-01 NOTE — PROGRESS NOTES
Identified pt with two pt identifiers(name and ). Chief Complaint   Patient presents with    Ear Pain     both ears hurt - right ear worse last 6 days    Cold Symptoms     cough, congestion    Generalized Body Aches        Health Maintenance Due   Topic    Shingrix Vaccine Age 50> (1 of 2)    Pneumococcal 65+ years (2 of 2 - PCV13)    MEDICARE YEARLY EXAM     GLAUCOMA SCREENING Q2Y        Wt Readings from Last 3 Encounters:   19 193 lb 9.6 oz (87.8 kg)   18 212 lb 12.8 oz (96.5 kg)   10/02/17 205 lb 9.6 oz (93.3 kg)     Temp Readings from Last 3 Encounters:   19 98.4 °F (36.9 °C) (Oral)   18 98.6 °F (37 °C) (Oral)   10/02/17 98.1 °F (36.7 °C) (Oral)     BP Readings from Last 3 Encounters:   19 120/62   18 110/58   10/02/17 112/64     Pulse Readings from Last 3 Encounters:   19 65   18 63   10/02/17 (!) 57         Learning Assessment:  :     Learning Assessment 3/11/2015   PRIMARY LEARNER Patient   HIGHEST LEVEL OF EDUCATION - PRIMARY LEARNER  > 4 YEARS OF COLLEGE   BARRIERS PRIMARY LEARNER VISUAL   CO-LEARNER CAREGIVER No   PRIMARY LANGUAGE ENGLISH   LEARNER PREFERENCE PRIMARY LISTENING   ANSWERED BY self   RELATIONSHIP SELF       Depression Screening:  :     3 most recent PHQ Screens 2019   Little interest or pleasure in doing things Not at all   Feeling down, depressed, irritable, or hopeless Not at all   Total Score PHQ 2 0       Fall Risk Assessment:  :     Fall Risk Assessment, last 12 mths 2019   Able to walk? Yes   Fall in past 12 months? No   Fall with injury? -   Number of falls in past 12 months -   Fall Risk Score -       Abuse Screening:  :     Abuse Screening Questionnaire 2019 10/2/2017 3/11/2015   Do you ever feel afraid of your partner? N N N   Are you in a relationship with someone who physically or mentally threatens you? N N N   Is it safe for you to go home?  Ana Franklin       Coordination of Care Questionnaire:  :     1) Have you been to an emergency room, urgent care clinic since your last visit[facility]  [facility] your [facility]? [facility]             2) Have you seen or consulted any other health care providers outside of [address] [place] [address] since your last visit? [facility] (Include any pap smears or colon screenings in this section.)    3) Do you have an Advance Directive on file? yes  Are you interested in receiving information about Advance Directives? no    Patient is accompanied by spouse I have received verbal consent from Aydin Bragg to discuss any/all medical information while they are present in the room. Reviewed record in preparation for visit and have obtained necessary documentation. Medication reconciliation up to date and corrected with patient at this time.      Results for orders placed or performed in visit on 11/01/19   AMB POC RAPID INFLUENZA TEST   Result Value Ref Range    VALID INTERNAL CONTROL POC Yes     QuickVue Influenza test Negative Negative

## 2019-11-11 ENCOUNTER — TELEPHONE (OUTPATIENT)
Dept: FAMILY MEDICINE CLINIC | Age: 79
End: 2019-11-11

## 2019-11-11 NOTE — TELEPHONE ENCOUNTER
Pt came in on 11/1/19 and stated that not feeling any better - started running a fever of 102 from Saturday night and still 102 and body aches all over.     Please advise pt as to what to do    Call alayna at [phone] or cell # [phone]

## 2019-11-13 ENCOUNTER — HOSPITAL ENCOUNTER (OUTPATIENT)
Dept: LAB | Age: 79
Discharge: HOME | End: 2019-11-13

## 2019-11-13 ENCOUNTER — OFFICE VISIT (OUTPATIENT)
Dept: FAMILY MEDICINE CLINIC | Age: 79
End: 2019-11-13

## 2019-11-13 VITALS
DIASTOLIC BLOOD PRESSURE: 60 MMHG | WEIGHT: 189.8 LBS | RESPIRATION RATE: 18 BRPM | HEART RATE: 63 BPM | HEIGHT: 69 IN | OXYGEN SATURATION: 97 % | BODY MASS INDEX: 28.11 KG/M2 | TEMPERATURE: 98 F | SYSTOLIC BLOOD PRESSURE: 114 MMHG

## 2019-11-13 DIAGNOSIS — R50.9 FEVER, UNSPECIFIED FEVER CAUSE: ICD-10-CM

## 2019-11-13 DIAGNOSIS — J06.9 URI WITH COUGH AND CONGESTION: Primary | ICD-10-CM

## 2019-11-13 DIAGNOSIS — J34.0 NASAL ULCER: ICD-10-CM

## 2019-11-13 DIAGNOSIS — R50.9 FEVER AND CHILLS: ICD-10-CM

## 2019-11-13 DIAGNOSIS — R50.9 FEVER AND CHILLS: Primary | ICD-10-CM

## 2019-11-13 LAB
BASOPHILS # BLD: 0 K/UL (ref 0–0.1)
BASOPHILS NFR BLD: 1 % (ref 0–1)
BILIRUB UR QL STRIP: NEGATIVE
BILIRUB UR QL STRIP: NEGATIVE
DIFFERENTIAL METHOD BLD: ABNORMAL
EOSINOPHIL # BLD: 0.1 K/UL (ref 0–0.4)
EOSINOPHIL NFR BLD: 2 % (ref 0–7)
ERYTHROCYTE [DISTWIDTH] IN BLOOD BY AUTOMATED COUNT: 11.9 % (ref 11.5–14.5)
GLUCOSE UR-MCNC: NEGATIVE MG/DL
GLUCOSE UR-MCNC: NEGATIVE MG/DL
HCT VFR BLD AUTO: 36.5 % (ref 36.6–50.3)
HGB BLD-MCNC: 12.3 G/DL (ref 12.1–17)
IMM GRANULOCYTES # BLD AUTO: 0 K/UL (ref 0–0.04)
IMM GRANULOCYTES NFR BLD AUTO: 0 % (ref 0–0.5)
KETONES P FAST UR STRIP-MCNC: NEGATIVE MG/DL
KETONES P FAST UR STRIP-MCNC: NEGATIVE MG/DL
LYMPHOCYTES # BLD: 1.6 K/UL (ref 0.8–3.5)
LYMPHOCYTES NFR BLD: 23 % (ref 12–49)
MCH RBC QN AUTO: 33 PG (ref 26–34)
MCHC RBC AUTO-ENTMCNC: 33.7 G/DL (ref 30–36.5)
MCV RBC AUTO: 97.9 FL (ref 80–99)
MONOCYTES # BLD: 0.8 K/UL (ref 0–1)
MONOCYTES NFR BLD: 11 % (ref 5–13)
NEUTS SEG # BLD: 4.4 K/UL (ref 1.8–8)
NEUTS SEG NFR BLD: 63 % (ref 32–75)
NRBC # BLD: 0 K/UL (ref 0–0.01)
NRBC BLD-RTO: 0 PER 100 WBC
PH UR STRIP: 5.5 [PH] (ref 4.6–8)
PH UR STRIP: 5.5 [PH] (ref 4.6–8)
PLATELET # BLD AUTO: 174 K/UL (ref 150–400)
PMV BLD AUTO: 10.2 FL (ref 8.9–12.9)
PROT UR QL STRIP: NEGATIVE
PROT UR QL STRIP: NEGATIVE
RBC # BLD AUTO: 3.73 M/UL (ref 4.1–5.7)
SP GR UR STRIP: 1.02 (ref 1–1.03)
SP GR UR STRIP: 1.02 (ref 1–1.03)
UA UROBILINOGEN AMB POC: NORMAL (ref 0.2–1)
UA UROBILINOGEN AMB POC: NORMAL (ref 0.2–1)
URINALYSIS CLARITY POC: CLEAR
URINALYSIS CLARITY POC: CLEAR
URINALYSIS COLOR POC: NORMAL
URINALYSIS COLOR POC: NORMAL
URINE BLOOD POC: NEGATIVE
URINE BLOOD POC: NEGATIVE
URINE LEUKOCYTES POC: NEGATIVE
URINE LEUKOCYTES POC: NEGATIVE
URINE NITRITES POC: NEGATIVE
URINE NITRITES POC: NEGATIVE
WBC # BLD AUTO: 7 K/UL (ref 4.1–11.1)

## 2019-11-13 RX ORDER — MUPIROCIN 20 MG/G
OINTMENT TOPICAL DAILY
Qty: 22 G | Refills: 0 | Status: SHIPPED | OUTPATIENT
Start: 2019-11-13 | End: 2022-07-15 | Stop reason: ALTCHOICE

## 2019-11-13 NOTE — PROGRESS NOTES
HISTORY OF PRESENT ILLNESS  Juila Watson is a 67 y.o. male. HPI  FU fever and chills. Fever broke on Monday. He still has a lot of post nasal drip. Took Benadryl but causes side effects. + cough. He had finished taking DOXY. His wife has given him some neb treatments. Pt also C/O sore inside nares. He trims nose hairs. Review of Systems   Constitutional: Positive for malaise/fatigue. HENT: Positive for congestion. Respiratory: Positive for cough. Cardiovascular: Positive for leg swelling. Visit Vitals  /60 (BP 1 Location: Left arm, BP Patient Position: Sitting) Comment: manual   Pulse 63   Temp 98 °F (36.7 °C) (Oral)   Resp 18   Ht 5' 9\" (1.753 m)   Wt 189 lb 12.8 oz (86.1 kg)   SpO2 97%   BMI 28.03 kg/m²       Physical Exam   Constitutional: He appears well-developed and well-nourished. HENT:   Right Ear: External ear normal.   Left Ear: External ear normal.   Mouth/Throat: Oropharynx is clear and moist.   Dull R TM   Eyes: Conjunctivae are normal.   Neck: Neck supple. Cardiovascular: Normal rate, regular rhythm and normal heart sounds. Pulmonary/Chest: Effort normal. He has rales. Musculoskeletal: He exhibits edema. Vitals reviewed. ASSESSMENT and PLAN    ICD-10-CM ICD-9-CM    1. URI with cough and congestion J06.9 465.9 XR CHEST PA LAT   2. Fever, unspecified fever cause R50.9 780.60    3. Nasal ulcer J34.0 478.19 mupirocin (BACTROBAN) 2 % ointment     Draw CBC w diff. Order CXR.

## 2019-11-13 NOTE — PROGRESS NOTES
Identified pt with two pt identifiers(name and ). Chief Complaint   Patient presents with    Fever     broke monday night - had fever 102.5 now normal     Chills    Ear Pain     pressure - he will hold his nose and blow to open them up    He would sleep for 3 hours and up 30 minutes and then back to bed. Health Maintenance Due   Topic    Shingrix Vaccine Age 50> (1 of 2)    MEDICARE YEARLY EXAM     GLAUCOMA SCREENING Q2Y        Wt Readings from Last 3 Encounters:   19 189 lb 12.8 oz (86.1 kg)   19 193 lb 9.6 oz (87.8 kg)   18 212 lb 12.8 oz (96.5 kg)     Temp Readings from Last 3 Encounters:   19 98 °F (36.7 °C) (Oral)   19 98.4 °F (36.9 °C) (Oral)   18 98.6 °F (37 °C) (Oral)     BP Readings from Last 3 Encounters:   19 114/60   19 120/62   18 110/58     Pulse Readings from Last 3 Encounters:   19 63   19 65   18 63         Learning Assessment:  :     Learning Assessment 3/11/2015   PRIMARY LEARNER Patient   HIGHEST LEVEL OF EDUCATION - PRIMARY LEARNER  > 4 YEARS OF COLLEGE   BARRIERS PRIMARY LEARNER VISUAL   CO-LEARNER CAREGIVER No   PRIMARY LANGUAGE ENGLISH   LEARNER PREFERENCE PRIMARY LISTENING   ANSWERED BY self   RELATIONSHIP SELF       Depression Screening:  :     3 most recent PHQ Screens 2019   Little interest or pleasure in doing things Not at all   Feeling down, depressed, irritable, or hopeless Not at all   Total Score PHQ 2 0       Fall Risk Assessment:  :     Fall Risk Assessment, last 12 mths 2019   Able to walk? Yes   Fall in past 12 months? No   Fall with injury? -   Number of falls in past 12 months -   Fall Risk Score -       Abuse Screening:  :     Abuse Screening Questionnaire 2019 10/2/2017 3/11/2015   Do you ever feel afraid of your partner? N N N   Are you in a relationship with someone who physically or mentally threatens you? N N N   Is it safe for you to go home?  Arabella Wilkerson of Care Questionnaire:  :     1) Have you been to an emergency room, urgent care clinic since your last visit? no   Hospitalized since your last visit? no             2) Have you seen or consulted any other health care providers outside of [address] [place] [address] since your last visit? no  (Include any pap smears or colon screenings in this section.)    3) Do you have an Advance Directive on file? yes  Are you interested in receiving information about Advance Directives? no    Patient is accompanied by spouse I have received verbal consent from Mago Rojo to discuss any/all medical information while they are present in the room. Reviewed record in preparation for visit and have obtained necessary documentation. Medication reconciliation up to date and corrected with patient at this time.

## 2019-11-14 ENCOUNTER — TELEPHONE (OUTPATIENT)
Dept: FAMILY MEDICINE CLINIC | Age: 79
End: 2019-11-14

## 2019-11-14 ENCOUNTER — HOSPITAL ENCOUNTER (OUTPATIENT)
Dept: GENERAL RADIOLOGY | Age: 79
Discharge: HOME | End: 2019-11-14
Attending: FAMILY MEDICINE
Payer: MEDICARE

## 2019-11-14 DIAGNOSIS — J06.9 URI WITH COUGH AND CONGESTION: ICD-10-CM

## 2019-11-14 PROBLEM — C43.30 MALIGNANT MELANOMA OF FACE (HCC): Status: RESOLVED | Noted: 2017-10-02 | Resolved: 2019-11-14

## 2019-11-14 PROCEDURE — 71046 X-RAY EXAM CHEST 2 VIEWS: CPT

## 2019-11-15 NOTE — TELEPHONE ENCOUNTER
Call to advise Mrs. Zamarripa that his WBC is normal.  This confirms that infection viral.  His CXR did not show any pneumonia or fluid build up.

## 2020-07-20 ENCOUNTER — OFFICE VISIT (OUTPATIENT)
Dept: FAMILY MEDICINE CLINIC | Age: 80
End: 2020-07-20

## 2020-07-20 VITALS
DIASTOLIC BLOOD PRESSURE: 76 MMHG | WEIGHT: 200 LBS | SYSTOLIC BLOOD PRESSURE: 122 MMHG | TEMPERATURE: 98.3 F | OXYGEN SATURATION: 98 % | RESPIRATION RATE: 20 BRPM | HEIGHT: 69 IN | HEART RATE: 78 BPM | BODY MASS INDEX: 29.62 KG/M2

## 2020-07-20 DIAGNOSIS — M79.89 FOOT SWELLING: ICD-10-CM

## 2020-07-20 DIAGNOSIS — L60.0 INGROWN TOENAIL OF BOTH FEET: Primary | ICD-10-CM

## 2020-07-20 NOTE — PROGRESS NOTES
Identified pt with two pt identifiers(name and ). Chief Complaint   Patient presents with    Foot Swelling     mostly left - cellulitis - he has been going to [place] but they have put him off till sept - he needs his toenails clipped        Health Maintenance Due   Topic    Shingrix Vaccine Age 50> (1 of 2)    Medicare Yearly Exam     GLAUCOMA SCREENING Q2Y        Wt Readings from Last 3 Encounters:   20 200 lb (90.7 kg)   19 189 lb 12.8 oz (86.1 kg)   19 193 lb 9.6 oz (87.8 kg)     Temp Readings from Last 3 Encounters:   20 98.3 °F (36.8 °C) (Oral)   19 98 °F (36.7 °C) (Oral)   19 98.4 °F (36.9 °C) (Oral)     BP Readings from Last 3 Encounters:   20 122/76   19 114/60   19 120/62     Pulse Readings from Last 3 Encounters:   20 78   19 63   19 65         Learning Assessment:  :     Learning Assessment 3/11/2015   PRIMARY LEARNER Patient   HIGHEST LEVEL OF EDUCATION - PRIMARY LEARNER  > 4 YEARS OF COLLEGE   BARRIERS PRIMARY LEARNER VISUAL   CO-LEARNER CAREGIVER No   PRIMARY LANGUAGE ENGLISH   LEARNER PREFERENCE PRIMARY LISTENING   ANSWERED BY self   RELATIONSHIP SELF       Depression Screening:  :     3 most recent PHQ Screens 2020   Little interest or pleasure in doing things Not at all   Feeling down, depressed, irritable, or hopeless Several days   Total Score PHQ 2 1       Fall Risk Assessment:  :     Fall Risk Assessment, last 12 mths 2020   Able to walk? Yes   Fall in past 12 months? No   Fall with injury? -   Number of falls in past 12 months -   Fall Risk Score -       Abuse Screening:  :     Abuse Screening Questionnaire 2020 2019 10/2/2017 3/11/2015   Do you ever feel afraid of your partner? N N N N   Are you in a relationship with someone who physically or mentally threatens you? N N N N   Is it safe for you to go home?  Erin Ayoub       Coordination of Care Questionnaire:  :     1) Have you been to an emergency room, urgent care clinic since your last visit? [facility] cellul[facility]  Hospitalized since your last visit? no             2) Have you seen or consulted any other health care providers outside of [address] [place] [address] since your last visit? [facility] (Include any pap smears or colon screenings in this section.)    3) Do you have an Advance Directive on file? no  Are you interested in receiving information about Advance Directives? no    Patient is accompanied by spouse I have received verbal consent from Marta Nevarez to discuss any/all medical information while they are present in the room. Reviewed record in preparation for visit and have obtained necessary documentation. Medication reconciliation up to date and corrected with patient at this time.

## 2020-07-20 NOTE — PATIENT INSTRUCTIONS
Ingrown Toenail: Care Instructions  Your Care Instructions     An ingrown toenail often occurs because a nail is not trimmed correctly or because shoes are too tight. An ingrown nail can cause an infection. If your toe is infected, your doctor may prescribe antibiotics. Most ingrown toenails can be treated at home. You should trim toenails straight across, so the ends of the nail grow over the skin and not into it. Good nail care can prevent ingrown toenails. Follow-up care is a key part of your treatment and safety. Be sure to make and go to all appointments, and call your doctor if you are having problems. It's also a good idea to know your test results and keep a list of the medicines you take. How can you care for yourself at home? · Trim the nails straight across. Leave the corners a little longer so they do not cut into the skin. To do this when you have an ingrown nail:  ? Soak your foot in warm water for about 15 minutes to soften the nail. ? Wedge a small piece of wet cotton under the corner of the nail to cushion the nail and lift it slightly. This keeps it from cutting the skin. ? Repeat daily until the nail has grown out and can be trimmed. · Do not use manicure scissors to dig under the ingrown nail. You might stab your toe, which could get infected. · Do not trim your toenails too short. · Check with your doctor before trimming your own toenails if you have been diagnosed with diabetes or peripheral arterial disease. These conditions increase the risk of an infection, because you may have decreased sensation in your toes and cut yourself without knowing it. · Wear roomy, comfortable shoes. · If your doctor prescribed antibiotics, take them as directed. Do not stop taking them just because you feel better. You need to take the full course of antibiotics. When should you call for help?    Call your doctor now or seek immediate medical care if:  · You have signs of infection, such as:  ? Increased pain, swelling, warmth, or redness. ? Red streaks leading from the toe. ? Pus draining from the toe. ? A fever. Watch closely for changes in your health, and be sure to contact your doctor if:  · You do not get better as expected. Where can you learn more? Go to http://www.gray.com/  Enter R135 in the search box to learn more about \"Ingrown Toenail: Care Instructions. \"  Current as of: October 31, 2019               Content Version: 12.5  © 2031-9362 [employer]. Care instructions adapted under license by [employer] (which disclaims liability or warranty for this information). If you have questions about a medical condition or this instruction, always ask your healthcare professional. [address] any warranty or liability for your use of this information.

## 2020-07-21 NOTE — PROGRESS NOTES
HISTORY OF PRESENT ILLNESS  Shyam Olivares is a 79 y.o. male. HPI  Pt C/O feet and lower leg swelling. He gets most of medical care provided at [facility] ([facility]) AT [place]. Recent hx treated for L foot/ leg cellulitis last November. trpuble with ingrowing toenails both feet. Persistent swelling of lower leg , worse L leg. He does not use compression stocking due to worsen ingrown toenails. Not able to get appt with Podiatry at [facility]. Request referral for ingrown toenails. Review of Systems   Cardiovascular: Positive for leg swelling. Current Outpatient Medications   Medication Sig Dispense Refill    hydrALAZINE (APRESOLINE) 10 mg tablet Take 10 mg by mouth three (3) times daily.  imiquimod (ALDARA) 5 % cream Apply  to affected area Every Thursday. apply a thin layer as directed      calcium carbonate (OS-ELAN) 500 mg calcium (1,250 mg) tablet Take  by mouth daily.  multivitamin (ONE A DAY) tablet Take 1 Tab by mouth daily.  omeprazole (PRILOSEC) 20 mg capsule Take 20 mg by mouth two (2) times a day.  levothyroxine (SYNTHROID) 112 mcg tablet Take  by mouth Daily (before breakfast).  LUTEIN PO Take  by mouth.  triamcinolone acetonide (KENALOG) 0.1 % topical cream Apply  to affected area two (2) times a day. use thin layer      latanoprost (XALATAN) 0.005 % ophthalmic solution Administer 1 Drop to both eyes nightly.  sodium chloride (AYR SALINE) 0.65 % nasal spray 1 Spray as needed for Congestion.  cholecalciferol (VITAMIN D3) 1,000 unit tablet Take 3,000 Units by mouth daily.  hydrochlorothiazide (HYDRODIURIL) 25 mg tablet Take 25 mg by mouth daily.  aspirin 81 mg tablet Take 81 mg by mouth.  dorzolamide (TRUSOPT) 2 % ophthalmic solution Administer 1 Drop to both eyes two (2) times a day.  brimonidine (ALPHAGAN) 0.2 % ophthalmic solution Administer 1 Drop to both eyes three (3) times daily.       ASCORBATE CALCIUM (VITAMIN C PO) Take 3,000 mg by mouth daily.  TERAZOSIN HCL (TERAZOSIN PO) Take 10 mg by mouth daily.  mupirocin (BACTROBAN) 2 % ointment Apply  to affected area daily. 22 g 0     Patient Active Problem List   Diagnosis Code    Essential hypertension, benign I10    Unspecified glaucoma(365.9) H40.9    Nonarteritic ischemic optic neuropathy H47.019    Basal cell carcinoma C44.91         Visit Vitals  /76 (BP 1 Location: Right arm, BP Patient Position: Sitting) Comment: manual   Pulse 78   Temp 98.3 °F (36.8 °C) (Oral)   Resp 20   Ht 5' 9\" (1.753 m)   Wt 200 lb (90.7 kg)   SpO2 98%   BMI 29.53 kg/m²       Physical Exam  Vitals signs reviewed. Constitutional:       General: He is not in acute distress. Appearance: Normal appearance. He is obese. He is not ill-appearing. Cardiovascular:      Rate and Rhythm: Normal rate and regular rhythm. Pulmonary:      Effort: Pulmonary effort is normal.      Breath sounds: Normal breath sounds. Musculoskeletal:      Right lower leg: Edema present. Left lower leg: Edema present. Feet:      Right foot:      Skin integrity: Skin integrity normal.      Toenail Condition: Right toenails are ingrown. Left foot:      Skin integrity: Skin integrity normal.      Toenail Condition: Left toenails are ingrown. Comments: Ingrowing toenails but no sign infection. No skin breakdown. 2+ edema lower legs. Neurological:      Mental Status: He is alert. ASSESSMENT and PLAN    ICD-10-CM ICD-9-CM    1. Ingrown toenail of both feet  L60.0 703.0 REFERRAL TO PODIATRY   2. Foot swelling  M79.89 729.81      No sign of cellulitis. He is on daily diuretics. Refer to Podiatry. FU at [facility].

## 2021-11-13 ENCOUNTER — APPOINTMENT (OUTPATIENT)
Dept: ULTRASOUND IMAGING | Age: 81
End: 2021-11-13
Attending: EMERGENCY MEDICINE
Payer: MEDICARE

## 2021-11-13 ENCOUNTER — HOSPITAL ENCOUNTER (EMERGENCY)
Age: 81
Discharge: HOME | End: 2021-11-13
Attending: EMERGENCY MEDICINE
Payer: MEDICARE

## 2021-11-13 VITALS
HEIGHT: 70 IN | WEIGHT: 195.77 LBS | TEMPERATURE: 98.3 F | RESPIRATION RATE: 18 BRPM | DIASTOLIC BLOOD PRESSURE: 86 MMHG | OXYGEN SATURATION: 98 % | BODY MASS INDEX: 28.03 KG/M2 | SYSTOLIC BLOOD PRESSURE: 199 MMHG | HEART RATE: 65 BPM

## 2021-11-13 DIAGNOSIS — I73.9 RIGHT LEG CLAUDICATION (HCC): Primary | ICD-10-CM

## 2021-11-13 LAB
ALBUMIN SERPL-MCNC: 3.4 G/DL (ref 3.5–5)
ALBUMIN/GLOB SERPL: 1 {RATIO} (ref 1.1–2.2)
ALP SERPL-CCNC: 56 U/L (ref 45–117)
ALT SERPL-CCNC: 30 U/L (ref 12–78)
ANION GAP SERPL CALC-SCNC: 6 MMOL/L (ref 5–15)
APPEARANCE UR: CLEAR
AST SERPL-CCNC: 14 U/L (ref 15–37)
BACTERIA URNS QL MICRO: NEGATIVE /HPF
BASOPHILS # BLD: 0 K/UL (ref 0–0.1)
BASOPHILS NFR BLD: 0 % (ref 0–1)
BILIRUB SERPL-MCNC: 0.6 MG/DL (ref 0.2–1)
BILIRUB UR QL: NEGATIVE
BUN SERPL-MCNC: 25 MG/DL (ref 6–20)
BUN/CREAT SERPL: 26 (ref 12–20)
CALCIUM SERPL-MCNC: 8.4 MG/DL (ref 8.5–10.1)
CHLORIDE SERPL-SCNC: 107 MMOL/L (ref 97–108)
CO2 SERPL-SCNC: 31 MMOL/L (ref 21–32)
COLOR UR: NORMAL
CREAT SERPL-MCNC: 0.96 MG/DL (ref 0.7–1.3)
D DIMER PPP FEU-MCNC: 0.68 MG/L FEU (ref 0–0.65)
DIFFERENTIAL METHOD BLD: ABNORMAL
EOSINOPHIL # BLD: 0 K/UL (ref 0–0.4)
EOSINOPHIL NFR BLD: 0 % (ref 0–7)
EPITH CASTS URNS QL MICRO: NORMAL /LPF
ERYTHROCYTE [DISTWIDTH] IN BLOOD BY AUTOMATED COUNT: 12.1 % (ref 11.5–14.5)
GLOBULIN SER CALC-MCNC: 3.4 G/DL (ref 2–4)
GLUCOSE SERPL-MCNC: 83 MG/DL (ref 65–100)
GLUCOSE UR STRIP.AUTO-MCNC: NEGATIVE MG/DL
HCT VFR BLD AUTO: 40.4 % (ref 36.6–50.3)
HGB BLD-MCNC: 13.5 G/DL (ref 12.1–17)
HGB UR QL STRIP: NEGATIVE
IMM GRANULOCYTES # BLD AUTO: 0 K/UL (ref 0–0.04)
IMM GRANULOCYTES NFR BLD AUTO: 0 % (ref 0–0.5)
KETONES UR QL STRIP.AUTO: NEGATIVE MG/DL
LEUKOCYTE ESTERASE UR QL STRIP.AUTO: NEGATIVE
LYMPHOCYTES # BLD: 2.9 K/UL (ref 0.8–3.5)
LYMPHOCYTES NFR BLD: 34 % (ref 12–49)
MCH RBC QN AUTO: 33 PG (ref 26–34)
MCHC RBC AUTO-ENTMCNC: 33.4 G/DL (ref 30–36.5)
MCV RBC AUTO: 98.8 FL (ref 80–99)
MONOCYTES # BLD: 1 K/UL (ref 0–1)
MONOCYTES NFR BLD: 12 % (ref 5–13)
NEUTS SEG # BLD: 4.6 K/UL (ref 1.8–8)
NEUTS SEG NFR BLD: 54 % (ref 32–75)
NITRITE UR QL STRIP.AUTO: NEGATIVE
NRBC # BLD: 0 K/UL (ref 0–0.01)
NRBC BLD-RTO: 0 PER 100 WBC
PH UR STRIP: 7 [PH] (ref 5–8)
PLATELET # BLD AUTO: 177 K/UL (ref 150–400)
PMV BLD AUTO: 9.9 FL (ref 8.9–12.9)
POTASSIUM SERPL-SCNC: 3.2 MMOL/L (ref 3.5–5.1)
PROT SERPL-MCNC: 6.8 G/DL (ref 6.4–8.2)
PROT UR STRIP-MCNC: NEGATIVE MG/DL
RBC # BLD AUTO: 4.09 M/UL (ref 4.1–5.7)
RBC #/AREA URNS HPF: NORMAL /HPF (ref 0–5)
SODIUM SERPL-SCNC: 144 MMOL/L (ref 136–145)
SP GR UR REFRACTOMETRY: 1.01 (ref 1–1.03)
UA: UC IF INDICATED,UAUC: NORMAL
UROBILINOGEN UR QL STRIP.AUTO: 0.2 EU/DL (ref 0.2–1)
WBC # BLD AUTO: 8.5 K/UL (ref 4.1–11.1)
WBC URNS QL MICRO: NORMAL /HPF (ref 0–4)

## 2021-11-13 PROCEDURE — 93970 EXTREMITY STUDY: CPT

## 2021-11-13 PROCEDURE — 85025 COMPLETE CBC W/AUTO DIFF WBC: CPT

## 2021-11-13 PROCEDURE — 81001 URINALYSIS AUTO W/SCOPE: CPT

## 2021-11-13 PROCEDURE — 36415 COLL VENOUS BLD VENIPUNCTURE: CPT

## 2021-11-13 PROCEDURE — 93005 ELECTROCARDIOGRAM TRACING: CPT

## 2021-11-13 PROCEDURE — 85379 FIBRIN DEGRADATION QUANT: CPT

## 2021-11-13 PROCEDURE — 99284 EMERGENCY DEPT VISIT MOD MDM: CPT

## 2021-11-13 PROCEDURE — 80053 COMPREHEN METABOLIC PANEL: CPT

## 2021-11-13 RX ORDER — DOXYCYCLINE 100 MG/1
100 CAPSULE ORAL 2 TIMES DAILY
COMMUNITY
End: 2021-12-15 | Stop reason: ALTCHOICE

## 2021-11-13 RX ORDER — PREDNISONE 20 MG/1
20 TABLET ORAL
COMMUNITY
End: 2021-12-15 | Stop reason: ALTCHOICE

## 2021-11-13 RX ORDER — TRAMADOL HYDROCHLORIDE 50 MG/1
50 TABLET ORAL
Qty: 10 TABLET | Refills: 0 | Status: SHIPPED | OUTPATIENT
Start: 2021-11-13 | End: 2021-11-15 | Stop reason: SDUPTHER

## 2021-11-13 NOTE — ED TRIAGE NOTES
t presents to ED with c/o cellulitis in bilateral feet for 10 days. Pt was seen at [facility] last week and placed on antibiocs. Dr Snell May in to examine pt.

## 2021-11-13 NOTE — ED PROVIDER NOTES
HPI the patient has a greater than 1 week history of pain in his legs, primarily in the right leg. He was seen at the [facility] 1 week ago diagnosed with cellulitis of his legs and put on doxycycline and prednisone. He is no better. He says that when he walks he develops more severe pain in the right leg that resolves with rest.  He denies having fever, shortness of breath, chest pain, abdominal pain, nausea/vomiting. Past Medical History:   Diagnosis Date    Basal cell carcinoma of ala nasi     Cancer ([facility] [address] 2011    Basal cell skin cancer.    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        Past Surgical History:   Procedure Laterality Date    HX MALIGNANT SKIN LESION EXCISION  03/2013    left side of face and left rib cage area         Family History:   Problem Relation Age of Onset    Cancer Mother     Diabetes Father     Cancer Father     Cancer Sister     Alcohol abuse Neg Hx     Arthritis-osteo Neg Hx     Asthma Neg Hx     Bleeding Prob Neg Hx     Elevated Lipids Neg Hx     Headache Neg Hx     Heart Disease Neg Hx     Hypertension Neg Hx     Lung Disease Neg Hx     Migraines Neg Hx     Psychiatric Disorder Neg Hx     Stroke Neg Hx     Mental Retardation Neg Hx        Social History     Socioeconomic History    Marital status:      Spouse name: Not on file    Number of children: Not on file    Years of education: Not on file    Highest education level: Not on file   Occupational History    Not on file   Tobacco Use    Smoking status: Never Smoker    Smokeless tobacco: Never Used   Substance and Sexual Activity    Alcohol use: No    Drug use: No    Sexual activity: Yes     Partners: Female   Other Topics Concern    Not on file   Social History Narrative    Not on file     Social Determinants of Health     Financial Resource Strain:     Difficulty of Paying Living Expenses: Not on file   Food Insecurity:     Worried About Running Out of Food in the Last Year: Not on file    [place] of Food in the Last Year: Not on file   Transportation Needs:     Lack of Transportation (Medical): Not on file    Lack of Transportation (Non-Medical): Not on file   Physical Activity:     Days of Exercise per Week: Not on file    Minutes of Exercise per Session: Not on file   Stress:     Feeling of Stress : Not on file   Social Connections:     Frequency of Communication with Friends and Family: Not on file    Frequency of Social Gatherings with Friends and Family: Not on file    Attends Latter-day Services: Not on file    Active Member of [address] [place] [address] [employer] or Organizations: Not on file    Attends Club or Organization Meetings: Not on file    Marital Status: Not on file   Intimate Partner Violence:     Fear of Current or Ex-Partner: Not on file    Emotionally Abused: Not on file    Physically Abused: Not on file    Sexually Abused: Not on file   Housing Stability:     Unable to Pay for Housing in the Last Year: Not on file    Number of [place] in the Last Year: Not on file    Unstable Housing in the Last Year: Not on file         ALLERGIES: Avelox [moxifloxacin], Codeine, Darvocet a500 [propoxyphene n-acetaminophen], Fluoxetine, Ibuprin, Pcn [penicillins], Prednisone, and Tamsulosin    Review of Systems   Constitutional: Negative for fever. HENT: Negative for voice change. Eyes: Negative for visual disturbance. Respiratory: Negative for cough and shortness of breath. Cardiovascular: Negative for chest pain. Gastrointestinal: Negative for abdominal pain, nausea and vomiting. Genitourinary: Negative for flank pain. Musculoskeletal: Positive for arthralgias. Negative for back pain. Skin: Negative for rash. Neurological: Negative for headaches. Psychiatric/Behavioral: Negative for confusion. There were no vitals filed for this visit.          Physical Exam  Constitutional:       General: He is not in acute distress. Appearance: He is well-developed. HENT:      Head: Normocephalic. Cardiovascular:      Rate and Rhythm: Normal rate. Heart sounds: No murmur heard. Pulmonary:      Effort: Pulmonary effort is normal.      Breath sounds: Normal breath sounds. Abdominal:      Palpations: Abdomen is soft. Tenderness: There is no abdominal tenderness. Musculoskeletal:         General: Normal range of motion. Cervical back: Normal range of motion. Comments: There is 1+ edema of the left leg and it has a palpable DP pulse. There is 2+ edema of the right leg and I am unable to palpate a pulse in the leg is cool to touch. Skin:     General: Skin is warm and dry. Capillary Refill: Capillary refill takes less than 2 seconds. Neurological:      Mental Status: He is alert. Psychiatric:         Behavior: Behavior normal.          MDM       Procedures ED EKG interpretation:  Rhythm: Sinus bradycardia, rate 56. Right bundle branch block. Nonspecific ST changes. This EKG was interpreted by Nancy Real MD,ED Provider. Faint Doppler pulses are present in the right DP and PT. The left leg has stronger DP and PT pulse. Spoke with Dr. Antoine Gama, vascular surgery and he will have his office call the patient Monday morning for an appointment.

## 2021-11-15 ENCOUNTER — TELEPHONE (OUTPATIENT)
Dept: FAMILY MEDICINE CLINIC | Age: 81
End: 2021-11-15

## 2021-11-15 DIAGNOSIS — I73.9 PAD (PERIPHERAL ARTERY DISEASE) (HCC): ICD-10-CM

## 2021-11-15 DIAGNOSIS — I73.9 RIGHT LEG CLAUDICATION (HCC): ICD-10-CM

## 2021-11-15 DIAGNOSIS — M79.604 PAIN OF RIGHT LOWER EXTREMITY: Primary | ICD-10-CM

## 2021-11-15 LAB
ATRIAL RATE: 56 BPM
CALCULATED P AXIS, ECG09: 28 DEGREES
CALCULATED R AXIS, ECG10: -55 DEGREES
CALCULATED T AXIS, ECG11: -27 DEGREES
DIAGNOSIS, 93000: NORMAL
P-R INTERVAL, ECG05: 198 MS
Q-T INTERVAL, ECG07: 458 MS
QRS DURATION, ECG06: 146 MS
QTC CALCULATION (BEZET), ECG08: 441 MS
VENTRICULAR RATE, ECG03: 56 BPM

## 2021-11-15 RX ORDER — TRAMADOL HYDROCHLORIDE 50 MG/1
50 TABLET ORAL
Qty: 10 TABLET | Refills: 0 | Status: SHIPPED | OUTPATIENT
Start: 2021-11-15 | End: 2021-11-18

## 2021-11-15 NOTE — TELEPHONE ENCOUNTER
----- Message from Shabana Arias sent at 11/15/2021  9:30 AM EST -----  Subject: Referral Request    QUESTIONS   Reason for referral request? Vascular Surgery   Has the physician seen you for this condition before? No   Preferred Specialist (if applicable)? Castro Resendiz  Do you already have an appointment scheduled? Yes  Select Scheduled Date? 2021-11-17  Select Scheduled Physician? Castro Resendiz   Additional Information for Provider? Please fax referral to Vascular   Surgeons @ [phone]. phone [phone]  ---------------------------------------------------------------------------  --------------  Miguelito Watson INFO  What is the best way for the office to contact you? OK to leave message on   voicemail  Preferred Call Back Phone Number?  [phone]

## 2021-11-15 NOTE — TELEPHONE ENCOUNTER
Called and spoke to pt to advise him he has not been seen since last year and needs an appt with Dr. Fabiana Huang. Pt's wife was not happy and wants referral put in. Pts wife stated he is in a lot of pain.

## 2021-11-15 NOTE — TELEPHONE ENCOUNTER
I spoke with pt's wife. Pt seen in ER over weekend. Concern for PAD, R leg pain. Has appt with Dr Aarti Wang on Wed. Need referral and also refill for Tramadol. He will run out before appt. Placed referral. Refill Tramadol. Please fax referral to Vascular office.

## 2021-11-19 ENCOUNTER — OFFICE VISIT (OUTPATIENT)
Dept: FAMILY MEDICINE CLINIC | Age: 81
End: 2021-11-19
Payer: MEDICARE

## 2021-11-19 VITALS
SYSTOLIC BLOOD PRESSURE: 136 MMHG | DIASTOLIC BLOOD PRESSURE: 82 MMHG | HEART RATE: 64 BPM | HEIGHT: 70 IN | BODY MASS INDEX: 27.49 KG/M2 | TEMPERATURE: 97.8 F | OXYGEN SATURATION: 98 % | WEIGHT: 192 LBS | RESPIRATION RATE: 16 BRPM

## 2021-11-19 DIAGNOSIS — M79.604 RIGHT LEG PAIN: Primary | ICD-10-CM

## 2021-11-19 PROCEDURE — G8419 CALC BMI OUT NRM PARAM NOF/U: HCPCS | Performed by: NURSE PRACTITIONER

## 2021-11-19 PROCEDURE — G8427 DOCREV CUR MEDS BY ELIG CLIN: HCPCS | Performed by: NURSE PRACTITIONER

## 2021-11-19 PROCEDURE — 99213 OFFICE O/P EST LOW 20 MIN: CPT | Performed by: NURSE PRACTITIONER

## 2021-11-19 PROCEDURE — G8752 SYS BP LESS 140: HCPCS | Performed by: NURSE PRACTITIONER

## 2021-11-19 PROCEDURE — G8536 NO DOC ELDER MAL SCRN: HCPCS | Performed by: NURSE PRACTITIONER

## 2021-11-19 PROCEDURE — G8432 DEP SCR NOT DOC, RNG: HCPCS | Performed by: NURSE PRACTITIONER

## 2021-11-19 PROCEDURE — 1101F PT FALLS ASSESS-DOCD LE1/YR: CPT | Performed by: NURSE PRACTITIONER

## 2021-11-19 PROCEDURE — G8754 DIAS BP LESS 90: HCPCS | Performed by: NURSE PRACTITIONER

## 2021-11-19 RX ORDER — TRAMADOL HYDROCHLORIDE 50 MG/1
50 TABLET ORAL
Qty: 12 TABLET | Refills: 0 | Status: SHIPPED | OUTPATIENT
Start: 2021-11-19 | End: 2021-11-22

## 2021-11-19 NOTE — PROGRESS NOTES
Identified pt with two pt identifiers(name and ). Chief Complaint   Patient presents with    Referral / Consult     ortho    Leg Pain     right side        Health Maintenance Due   Topic    COVID-19 Vaccine (1)    Shingrix Vaccine Age 49> (1 of 2)    Medicare Yearly Exam     Flu Vaccine (1)       Wt Readings from Last 3 Encounters:   21 192 lb (87.1 kg)   21 195 lb 12.3 oz (88.8 kg)   20 200 lb (90.7 kg)     Temp Readings from Last 3 Encounters:   21 97.8 °F (36.6 °C) (Temporal)   21 98.3 °F (36.8 °C)   20 98.3 °F (36.8 °C) (Oral)     BP Readings from Last 3 Encounters:   21 136/82   21 (!) 199/86   20 122/76     Pulse Readings from Last 3 Encounters:   21 64   21 65   20 78         Learning Assessment:  :     Learning Assessment 3/11/2015   PRIMARY LEARNER Patient   HIGHEST LEVEL OF EDUCATION - PRIMARY LEARNER  > 4 YEARS OF COLLEGE   BARRIERS PRIMARY LEARNER VISUAL   CO-LEARNER CAREGIVER No   PRIMARY LANGUAGE ENGLISH   LEARNER PREFERENCE PRIMARY LISTENING   ANSWERED BY self   RELATIONSHIP SELF       Depression Screening:  :     3 most recent PHQ Screens 2021   Little interest or pleasure in doing things Not at all   Feeling down, depressed, irritable, or hopeless Not at all   Total Score PHQ 2 0       Fall Risk Assessment:  :     Fall Risk Assessment, last 12 mths 2020   Able to walk? Yes   Fall in past 12 months? No   Number of falls in past 12 months -   Fall with injury? -       Abuse Screening:  :     Abuse Screening Questionnaire 2021 2020 2019 10/2/2017 3/11/2015   Do you ever feel afraid of your partner? N N N N N   Are you in a relationship with someone who physically or mentally threatens you? N N N N N   Is it safe for you to go home?  Y Y Y Y Y       Coordination of Care Questionnaire:  :     1) Have you been to an emergency room, urgent care clinic since your last visit? no   Hospitalized since your last visit? no             2) Have you seen or consulted any other health care providers outside of [address] [place] [address] since your last visit? no  (Include any pap smears or colon screenings in this section.)    3) Do you have an Advance Directive on file? no  Are you interested in receiving information about Advance Directives? no    Patient is accompanied by wife I have received verbal consent from Major Coxton to discuss any/all medical information while they are present in the room. Reviewed record in preparation for visit and have obtained necessary documentation.

## 2021-11-19 NOTE — PROGRESS NOTES
HISTORY OF PRESENT ILLNESS  Savanna Ortiz is a [de-identified] y.o. male. HPI   Pt presents with \"continued right leg pain\"  Pt states that the pain started 3 weeks ago. He was sen by the [place] 3 weeks ago for the pain, and was diagnosed with cellulitis, and sciatic nerve problems. He was prescribed prednisone, antibiotics and tramadol. X-rays were stable, besides arthritis. The pain persisted, so he went to the [facility] ER on 11/13. He was told in the ER that they thought he had a vascular blockage in the leg, and needed to see a vascular surgeon. He was seen by Dr Arnel Anderson, and was told that he has no blockage. They informed him that he should be seen by ortho, due to continued pain. Pt continues to have pain in the right leg. At times, it feels like the sciatic nerve down the right leg. Other times, there is a low grade ache in his right thigh. Sometimes, the right calf is sore and so painful that he is unable to bear weight on the right leg,    Pt is requesting refill of Tramadol, as he is about to be out from the refills that Dr Deepa Resendiz gave him. The tramadol dose help his pain. Review of Systems   Constitutional: Negative for fever. Musculoskeletal: Positive for joint pain. Physical Exam  Constitutional:       Appearance: Normal appearance. Cardiovascular:      Rate and Rhythm: Normal rate and regular rhythm. Heart sounds: Normal heart sounds. Pulmonary:      Effort: Pulmonary effort is normal.      Breath sounds: Normal breath sounds. Neurological:      Mental Status: He is alert. Psychiatric:         Mood and Affect: Mood normal.         Behavior: Behavior normal.         ASSESSMENT and PLAN    ICD-10-CM ICD-9-CM    1.  Right leg pain  M79.604 729.5 REFERRAL TO ORTHOPEDICS      traMADoL (ULTRAM) 50 mg tablet     Educated about calling ortho for an appointment  Educated about Ortho on Call over the weekend, should the pain become more intense    Medication refilled for short course to cover him until seen by speciality    Pt informed to return to office with worsening of symptoms, or PRN with any questions or concerns. Pt verbalizes understanding of plan of care and denies further questions or concerns at this time.

## 2021-11-23 ENCOUNTER — TELEPHONE (OUTPATIENT)
Dept: FAMILY MEDICINE CLINIC | Age: 81
End: 2021-11-23

## 2021-11-23 DIAGNOSIS — M79.604 RIGHT LEG PAIN: Primary | ICD-10-CM

## 2021-11-23 DIAGNOSIS — M79.604 PAIN OF RIGHT LOWER EXTREMITY: ICD-10-CM

## 2021-11-23 NOTE — TELEPHONE ENCOUNTER
Pt was given a doctors order for Dr Sol Borges but does not take his insurance and pt found new provider with [facility] [address]  Phone # [phone] appt date 11/29/21 at 12 pm and needs doctor referral put in the system for this provider    Questions call alayna at [phone]

## 2021-12-15 ENCOUNTER — OFFICE VISIT (OUTPATIENT)
Dept: FAMILY MEDICINE CLINIC | Age: 81
End: 2021-12-15
Payer: MEDICARE

## 2021-12-15 VITALS
TEMPERATURE: 98.1 F | DIASTOLIC BLOOD PRESSURE: 70 MMHG | BODY MASS INDEX: 27.37 KG/M2 | WEIGHT: 191.2 LBS | SYSTOLIC BLOOD PRESSURE: 149 MMHG | OXYGEN SATURATION: 96 % | HEIGHT: 70 IN | RESPIRATION RATE: 18 BRPM | HEART RATE: 59 BPM

## 2021-12-15 DIAGNOSIS — R60.0 BILATERAL LEG EDEMA: ICD-10-CM

## 2021-12-15 DIAGNOSIS — L03.115 CELLULITIS OF RIGHT LOWER EXTREMITY: Primary | ICD-10-CM

## 2021-12-15 DIAGNOSIS — M54.31 SCIATICA OF RIGHT SIDE: ICD-10-CM

## 2021-12-15 DIAGNOSIS — I10 ESSENTIAL HYPERTENSION, BENIGN: ICD-10-CM

## 2021-12-15 PROCEDURE — G8536 NO DOC ELDER MAL SCRN: HCPCS | Performed by: FAMILY MEDICINE

## 2021-12-15 PROCEDURE — G8427 DOCREV CUR MEDS BY ELIG CLIN: HCPCS | Performed by: FAMILY MEDICINE

## 2021-12-15 PROCEDURE — G8753 SYS BP > OR = 140: HCPCS | Performed by: FAMILY MEDICINE

## 2021-12-15 PROCEDURE — 1101F PT FALLS ASSESS-DOCD LE1/YR: CPT | Performed by: FAMILY MEDICINE

## 2021-12-15 PROCEDURE — 99214 OFFICE O/P EST MOD 30 MIN: CPT | Performed by: FAMILY MEDICINE

## 2021-12-15 PROCEDURE — G8419 CALC BMI OUT NRM PARAM NOF/U: HCPCS | Performed by: FAMILY MEDICINE

## 2021-12-15 PROCEDURE — G8754 DIAS BP LESS 90: HCPCS | Performed by: FAMILY MEDICINE

## 2021-12-15 PROCEDURE — G8510 SCR DEP NEG, NO PLAN REQD: HCPCS | Performed by: FAMILY MEDICINE

## 2021-12-15 RX ORDER — MELOXICAM 15 MG/1
15 TABLET ORAL DAILY
Qty: 30 TABLET | Refills: 0 | Status: SHIPPED | OUTPATIENT
Start: 2021-12-15 | End: 2022-07-15 | Stop reason: ALTCHOICE

## 2021-12-15 RX ORDER — TERAZOSIN 10 MG/1
10 CAPSULE ORAL
COMMUNITY

## 2021-12-15 RX ORDER — TRAMADOL HYDROCHLORIDE 50 MG/1
50 TABLET ORAL
COMMUNITY
End: 2021-12-15 | Stop reason: SDUPTHER

## 2021-12-15 RX ORDER — MELOXICAM 15 MG/1
15 TABLET ORAL DAILY
COMMUNITY
End: 2021-12-15 | Stop reason: SDUPTHER

## 2021-12-15 RX ORDER — TRAMADOL HYDROCHLORIDE 50 MG/1
50 TABLET ORAL
Qty: 28 TABLET | Refills: 0 | Status: SHIPPED | OUTPATIENT
Start: 2021-12-15 | End: 2021-12-22

## 2021-12-15 RX ORDER — VIT A/VIT C/VIT E/ZINC/COPPER 4296-226
1 CAPSULE ORAL
COMMUNITY

## 2021-12-15 RX ORDER — DOXYCYCLINE 100 MG/1
100 CAPSULE ORAL 2 TIMES DAILY
Qty: 14 CAPSULE | Refills: 0 | Status: SHIPPED | OUTPATIENT
Start: 2021-12-15 | End: 2021-12-22

## 2021-12-15 NOTE — PROGRESS NOTES
HISTORY OF PRESENT ILLNESS  Graeme Ruiz is a [de-identified] y.o. male. HPI  FU R leg pain. Started after doing yard work Nov 6. Developed pain from low back, down R leg, R lower leg swelling. Burning pain when he tried to lay back. Seen at [facility] ER. DX sciatic nerve. XR's done of back and hip - negative. Given prednisone course for 9 days, antibiotic DOXY x 5 days and Tramadol for pain. No relief of pain. Unable to walk on R leg. Then went to [facility] Nov 13. Doppler of leg done to rule out DVT. Left foot also started swelling. Referred to Vascular Surgeon - Dr Melody Maria. Arteries checked out fine. Referred to [facility] in [place] Dr Annette Cedeno. XR repeated - no fx. By that time, pain getting better. Given Meloxicam 15 mg daily. Also wearing compression socks for swelling of legs. He feels feverish by end of day. Temp 99. Review of Systems   Constitutional: Positive for fever. Cardiovascular: Positive for leg swelling. Musculoskeletal: Positive for back pain. Visit Vitals  BP (!) 149/70 (BP 1 Location: Left arm, BP Patient Position: Sitting, BP Cuff Size: Large adult)   Pulse (!) 59   Temp 98.1 °F (36.7 °C) (Oral)   Resp 18   Ht 5' 10\" (1.778 m)   Wt 191 lb 3.2 oz (86.7 kg)   SpO2 96%   BMI 27.43 kg/m²       Physical Exam  Vitals reviewed. Constitutional:       Appearance: Normal appearance. Musculoskeletal:      Right lower leg: Swelling present. No tenderness. Edema present. Left lower leg: Swelling present. No tenderness. Edema present. Comments: Mild brown discoloration skin of RLE. No skin breakdown. No warmth. Non tender. Neurological:      Mental Status: He is alert. ASSESSMENT and PLAN    ICD-10-CM ICD-9-CM    1. Cellulitis of right lower extremity  L03.115 682.6 doxycycline (VIBRAMYCIN) 100 mg capsule      CBC WITH AUTOMATED DIFF      CBC WITH AUTOMATED DIFF   2.  Sciatica of right side  M54.31 724.3 meloxicam (MOBIC) 15 mg tablet      traMADoL (ULTRAM) 50 mg tablet   3. Bilateral leg edema  S90.0 471.8 METABOLIC PANEL, BASIC      METABOLIC PANEL, BASIC   4. Essential hypertension, benign  I10 401.1      RF DOXY x 1 week  Cont Meloxicam  Cont use compression stockings. Elevate legs.   Check BMP  Increase Hydralazine 10 mg TID for HTN  RF Tramadol  FU 2 weeks

## 2021-12-15 NOTE — PROGRESS NOTES
Chief Complaint   Patient presents with    Foot Swelling     right for 6 weeks    Fever     at night     3 most recent PHQ Screens 12/15/2021   Little interest or pleasure in doing things Not at all   Feeling down, depressed, irritable, or hopeless Not at all   Total Score PHQ 2 0     Abuse Screening Questionnaire 11/19/2021   Do you ever feel afraid of your partner? N   Are you in a relationship with someone who physically or mentally threatens you? N   Is it safe for you to go home? Y     Visit Vitals  BP (!) 149/70 (BP 1 Location: Left arm, BP Patient Position: Sitting, BP Cuff Size: Large adult)   Pulse (!) 59   Temp 98.1 °F (36.7 °C) (Oral)   Resp 18   Ht 5' 10\" (1.778 m)   Wt 191 lb 3.2 oz (86.7 kg)   SpO2 96%   BMI 27.43 kg/m²     1. Have you been to the ER, urgent care clinic since your last visit? [facility]    2. Have you seen or consulted any other health care providers outside of the [address] [place] [address] since your last visit? Include any pap smears or colon screening.  Yes Where: [facility], Nov 2021 for R leg pain

## 2021-12-15 NOTE — PATIENT INSTRUCTIONS
Back Pain: Care Instructions  Your Care Instructions     Back pain has many possible causes. It is often related to problems with muscles and ligaments of the back. It may also be related to problems with the nerves, discs, or bones of the back. Moving, lifting, standing, sitting, or sleeping in an awkward way can strain the back. Sometimes you don't notice the injury until later. Arthritis is another common cause of back pain. Although it may hurt a lot, back pain usually improves on its own within several weeks. Most people recover in 12 weeks or less. Using good home treatment and being careful not to stress your back can help you feel better sooner. Follow-up care is a key part of your treatment and safety. Be sure to make and go to all appointments, and call your doctor if you are having problems. It's also a good idea to know your test results and keep a list of the medicines you take. How can you care for yourself at home? · Sit or lie in positions that are most comfortable and reduce your pain. Try one of these positions when you lie down:  ? Lie on your back with your knees bent and supported by large pillows. ? Lie on the floor with your legs on the seat of a sofa or chair. ? Lie on your side with your knees and hips bent and a pillow between your legs. ? Lie on your stomach if it does not make pain worse. · Do not sit up in bed, and avoid soft couches and twisted positions. Bed rest can help relieve pain at first, but it delays healing. Avoid bed rest after the first day of back pain. · Change positions every 30 minutes. If you must sit for long periods of time, take breaks from sitting. Get up and walk around, or lie in a comfortable position. · Try using a heating pad on a low or medium setting for 15 to 20 minutes every 2 or 3 hours. Try a warm shower in place of one session with the heating pad. · You can also try an ice pack for 10 to 15 minutes every 2 to 3 hours.  Put a thin cloth between the ice pack and your skin. · Take pain medicines exactly as directed. ? If the doctor gave you a prescription medicine for pain, take it as prescribed. ? If you are not taking a prescription pain medicine, ask your doctor if you can take an over-the-counter medicine. · Take short walks several times a day. You can start with 5 to 10 minutes, 3 or 4 times a day, and work up to longer walks. Walk on level surfaces and avoid hills and stairs until your back is better. · Return to work and other activities as soon as you can. Continued rest without activity is usually not good for your back. · To prevent future back pain, do exercises to stretch and strengthen your back and stomach. Learn how to use good posture, safe lifting techniques, and proper body mechanics. When should you call for help? Call your doctor now or seek immediate medical care if:    · You have new or worsening numbness in your legs.     · You have new or worsening weakness in your legs. (This could make it hard to stand up.)     · You lose control of your bladder or bowels. Watch closely for changes in your health, and be sure to contact your doctor if:    · You have a fever, lose weight, or don't feel well.     · You do not get better as expected. Where can you learn more? Go to http://www.garay.com/  Enter I594 in the search box to learn more about \"Back Pain: Care Instructions. \"  Current as of: July 1, 2021               Content Version: 13.0  © 2297-7880 [employer]. Care instructions adapted under license by [employer] (which disclaims liability or warranty for this information). If you have questions about a medical condition or this instruction, always ask your healthcare professional. [place] [address] any warranty or liability for your use of this information.

## 2021-12-16 LAB
ANION GAP SERPL CALC-SCNC: 6 MMOL/L (ref 5–15)
BASOPHILS # BLD: 0 K/UL (ref 0–0.1)
BASOPHILS NFR BLD: 1 % (ref 0–1)
BUN SERPL-MCNC: 21 MG/DL (ref 6–20)
BUN/CREAT SERPL: 26 (ref 12–20)
CALCIUM SERPL-MCNC: 8.9 MG/DL (ref 8.5–10.1)
CHLORIDE SERPL-SCNC: 107 MMOL/L (ref 97–108)
CO2 SERPL-SCNC: 29 MMOL/L (ref 21–32)
CREAT SERPL-MCNC: 0.82 MG/DL (ref 0.7–1.3)
DIFFERENTIAL METHOD BLD: ABNORMAL
EOSINOPHIL # BLD: 0.1 K/UL (ref 0–0.4)
EOSINOPHIL NFR BLD: 3 % (ref 0–7)
ERYTHROCYTE [DISTWIDTH] IN BLOOD BY AUTOMATED COUNT: 12.6 % (ref 11.5–14.5)
GLUCOSE SERPL-MCNC: 80 MG/DL (ref 65–100)
HCT VFR BLD AUTO: 39.1 % (ref 36.6–50.3)
HGB BLD-MCNC: 13.3 G/DL (ref 12.1–17)
IMM GRANULOCYTES # BLD AUTO: 0 K/UL (ref 0–0.04)
IMM GRANULOCYTES NFR BLD AUTO: 0 % (ref 0–0.5)
LYMPHOCYTES # BLD: 1.2 K/UL (ref 0.8–3.5)
LYMPHOCYTES NFR BLD: 23 % (ref 12–49)
MCH RBC QN AUTO: 34.2 PG (ref 26–34)
MCHC RBC AUTO-ENTMCNC: 34 G/DL (ref 30–36.5)
MCV RBC AUTO: 100.5 FL (ref 80–99)
MONOCYTES # BLD: 0.5 K/UL (ref 0–1)
MONOCYTES NFR BLD: 9 % (ref 5–13)
NEUTS SEG # BLD: 3.4 K/UL (ref 1.8–8)
NEUTS SEG NFR BLD: 64 % (ref 32–75)
NRBC # BLD: 0 K/UL (ref 0–0.01)
NRBC BLD-RTO: 0 PER 100 WBC
PLATELET # BLD AUTO: 154 K/UL (ref 150–400)
PMV BLD AUTO: 10.3 FL (ref 8.9–12.9)
POTASSIUM SERPL-SCNC: 3.8 MMOL/L (ref 3.5–5.1)
RBC # BLD AUTO: 3.89 M/UL (ref 4.1–5.7)
SODIUM SERPL-SCNC: 142 MMOL/L (ref 136–145)
WBC # BLD AUTO: 5.2 K/UL (ref 4.1–11.1)

## 2021-12-17 ENCOUNTER — TELEPHONE (OUTPATIENT)
Dept: FAMILY MEDICINE CLINIC | Age: 81
End: 2021-12-17

## 2021-12-17 NOTE — TELEPHONE ENCOUNTER
----- Message from Ry Erickson MD sent at 12/16/2021  8:10 PM EST -----  Call pt. Labs look fine. Normal white blood cell count. Normal kidney function.

## 2021-12-29 ENCOUNTER — OFFICE VISIT (OUTPATIENT)
Dept: FAMILY MEDICINE CLINIC | Age: 81
End: 2021-12-29
Payer: MEDICARE

## 2021-12-29 VITALS
SYSTOLIC BLOOD PRESSURE: 111 MMHG | DIASTOLIC BLOOD PRESSURE: 54 MMHG | RESPIRATION RATE: 16 BRPM | HEART RATE: 60 BPM | HEIGHT: 70 IN | BODY MASS INDEX: 26.71 KG/M2 | WEIGHT: 186.6 LBS | OXYGEN SATURATION: 97 % | TEMPERATURE: 98.8 F

## 2021-12-29 DIAGNOSIS — M54.31 SCIATICA OF RIGHT SIDE: Primary | ICD-10-CM

## 2021-12-29 DIAGNOSIS — Z23 NEEDS FLU SHOT: ICD-10-CM

## 2021-12-29 DIAGNOSIS — I10 ESSENTIAL HYPERTENSION, BENIGN: ICD-10-CM

## 2021-12-29 DIAGNOSIS — L03.115 CELLULITIS OF RIGHT LOWER EXTREMITY: ICD-10-CM

## 2021-12-29 PROCEDURE — G8536 NO DOC ELDER MAL SCRN: HCPCS | Performed by: FAMILY MEDICINE

## 2021-12-29 PROCEDURE — 99213 OFFICE O/P EST LOW 20 MIN: CPT | Performed by: FAMILY MEDICINE

## 2021-12-29 PROCEDURE — G8419 CALC BMI OUT NRM PARAM NOF/U: HCPCS | Performed by: FAMILY MEDICINE

## 2021-12-29 PROCEDURE — 90694 VACC AIIV4 NO PRSRV 0.5ML IM: CPT | Performed by: FAMILY MEDICINE

## 2021-12-29 PROCEDURE — G0008 ADMIN INFLUENZA VIRUS VAC: HCPCS | Performed by: FAMILY MEDICINE

## 2021-12-29 PROCEDURE — G8754 DIAS BP LESS 90: HCPCS | Performed by: FAMILY MEDICINE

## 2021-12-29 PROCEDURE — G8427 DOCREV CUR MEDS BY ELIG CLIN: HCPCS | Performed by: FAMILY MEDICINE

## 2021-12-29 PROCEDURE — 1101F PT FALLS ASSESS-DOCD LE1/YR: CPT | Performed by: FAMILY MEDICINE

## 2021-12-29 PROCEDURE — G8752 SYS BP LESS 140: HCPCS | Performed by: FAMILY MEDICINE

## 2021-12-29 PROCEDURE — G8510 SCR DEP NEG, NO PLAN REQD: HCPCS | Performed by: FAMILY MEDICINE

## 2021-12-29 NOTE — PATIENT INSTRUCTIONS
Vaccine Information Statement    Influenza (Flu) Vaccine (Inactivated or Recombinant): What You Need to Know    Many vaccine information statements are available in Turkish and other languages. See www.immunize.org/vis. Hojas de información sobre vacunas están disponibles en español y en muchos otros idiomas. Visite www.immunize.org/vis. 1. Why get vaccinated? Influenza vaccine can prevent influenza (flu). Flu is a contagious disease that spreads around the [facility] every year, usually between October and May. Anyone can get the flu, but it is more dangerous for some people. Infants and young children, people 72 years and older, pregnant people, and people with certain health conditions or a weakened immune system are at greatest risk of flu complications. Pneumonia, bronchitis, sinus infections, and ear infections are examples of flu-related complications. If you have a medical condition, such as heart disease, cancer, or diabetes, flu can make it worse. Flu can cause fever and chills, sore throat, muscle aches, fatigue, cough, headache, and runny or stuffy nose. Some people may have vomiting and diarrhea, though this is more common in children than adults. In an average year, thousands of people in the [facility] die from flu, and many more are hospitalized. Flu vaccine prevents millions of illnesses and flu-related visits to the doctor each year. 2. Influenza vaccines     CDC recommends everyone 6 months and older get vaccinated every flu season. Children 6 months through 6years of age may need 2 doses during a single flu season. Everyone else needs only 1 dose each flu season. It takes about 2 weeks for protection to develop after vaccination. There are many flu viruses, and they are always changing. Each year a new flu vaccine is made to protect against the influenza viruses believed to be likely to cause disease in the upcoming flu season.  Even when the vaccine doesnt exactly match these viruses, it may still provide some protection. Influenza vaccine does not cause flu. Influenza vaccine may be given at the same time as other vaccines. 3. Talk with your health care provider    Tell your vaccination provider if the person getting the vaccine:   Has had an allergic reaction after a previous dose of influenza vaccine, or has any severe, life-threatening allergies    Has ever had Guillain-Barré Syndrome (also called GBS)    In some cases, your health care provider may decide to postpone influenza vaccination until a future visit. Influenza vaccine can be administered at any time during pregnancy. People who are or will be pregnant during influenza season should receive inactivated influenza vaccine. People with minor illnesses, such as a cold, may be vaccinated. People who are moderately or severely ill should usually wait until they recover before getting influenza vaccine. Your health care provider can give you more information. 4. Risks of a vaccine reaction     Soreness, redness, and swelling where the shot is given, fever, muscle aches, and headache can happen after influenza vaccination.  There may be a very small increased risk of Guillain-Barré Syndrome (GBS) after inactivated influenza vaccine (the flu shot). [facility] who get the flu shot along with pneumococcal vaccine (PCV13) and/or DTaP vaccine at the same time might be slightly more likely to have a seizure caused by fever. Tell your health care provider if a child who is getting flu vaccine has ever had a seizure. People sometimes faint after medical procedures, including vaccination. Tell your provider if you feel dizzy or have vision changes or ringing in the ears. As with any medicine, there is a very remote chance of a vaccine causing a severe allergic reaction, other serious injury, or death. 5. What if there is a serious problem?     An allergic reaction could occur after the vaccinated person leaves the clinic. If you see signs of a severe allergic reaction (hives, swelling of the face and throat, difficulty breathing, a fast heartbeat, dizziness, or weakness), call 9-1-1 and get the person to the nearest hospital.    For other signs that concern you, call your health care provider. Adverse reactions should be reported to the Vaccine Adverse Event Reporting System (VAERS). Your health care provider will usually file this report, or you can do it yourself. Visit the VAERS website at www.vaers. [facility].gov or call 8-179.200.7035. VAERS is only for reporting reactions, and VAERS staff members do not give medical advice. 6. The National Vaccine Injury Compensation Program    The [facility] Vaccine Injury Compensation Program (VICP) is a federal program that was created to compensate people who may have been injured by certain vaccines. Claims regarding alleged injury or death due to vaccination have a time limit for filing, which may be as short as two years. Visit the VICP website at www.[facility].gov/vaccinecompensation or call 1-821.651.2644 to learn about the program and about filing a claim. 7. How can I learn more?  Ask your health care provider.  Call your local or state health department.  Visit the website of the Food and Drug Administration (FDA) for vaccine package inserts and additional information at www.fda.gov/vaccines-blood-biologics/vaccines.  Contact the Centers for Disease Control and Prevention (CDC):  - Call 1-595.544.3286 (1-800-CDC-INFO) or  - Visit CDCs influenza website at www.cdc.gov/flu. Vaccine Information Statement   Inactivated Influenza Vaccine   8/6/2021  42 JAMES Moulton [id number]   Department of Health and Human Services  Centers for Disease Control and Prevention    Office Use Only

## 2021-12-29 NOTE — PROGRESS NOTES
HISTORY OF PRESENT ILLNESS  Markus Reece is a [de-identified] y.o. male. HPI  FU R leg cellulitis, HTN, and sciatica. He is feeling much better. Leg is not red or swollen. Back is better with Meloxicam.  BP is better with taking Hydralazine TID. ROS  Visit Vitals  BP (!) 111/54 (BP 1 Location: Left arm, BP Patient Position: Sitting, BP Cuff Size: Small adult)   Pulse 60   Temp 98.8 °F (37.1 °C) (Oral)   Resp 16   Ht 5' 10\" (1.778 m)   Wt 186 lb 9.6 oz (84.6 kg)   SpO2 97%   BMI 26.77 kg/m²       Physical Exam  Vitals reviewed. Constitutional:       Appearance: Normal appearance. Musculoskeletal:         General: No swelling. Right lower leg: No edema. Neurological:      Mental Status: He is alert. ASSESSMENT and PLAN    ICD-10-CM ICD-9-CM    1. Sciatica of right side  M54.31 724.3    2. Cellulitis of right lower extremity  L03.115 682.6    3. Essential hypertension, benign  I10 401.1    4. Needs flu shot  Z23 V04.81 FLU (FLUAD QUAD INFLUENZA VACCINE,QUAD,ADJUVANTED)     Resolved cellulitis R LE  Cont Meloxicam for now. FU with [facility] PCP. Monitor BP at home.   FLU vaccine given

## 2021-12-29 NOTE — PROGRESS NOTES
Chief Complaint   Patient presents with    Medication Evaluation     Visit Vitals  BP (!) 111/54 (BP 1 Location: Left arm, BP Patient Position: Sitting, BP Cuff Size: Small adult)   Pulse 60   Temp 98.8 °F (37.1 °C) (Oral)   Resp 16   Ht 5' 10\" (1.778 m)   Wt 186 lb 9.6 oz (84.6 kg)   SpO2 97%   BMI 26.77 kg/m²     1. Have you been to the ER, urgent care clinic since your last visit? Hospitalized since your last visit?no    2. Have you seen or consulted any other health care providers outside of the [address] [place] [address] since your last visit? Include any pap smears or colon screening.  no

## 2022-03-20 PROBLEM — C44.91 BASAL CELL CARCINOMA: Status: ACTIVE | Noted: 2017-10-02

## 2022-07-15 ENCOUNTER — OFFICE VISIT (OUTPATIENT)
Dept: FAMILY MEDICINE CLINIC | Age: 82
End: 2022-07-15
Payer: MEDICARE

## 2022-07-15 VITALS
WEIGHT: 183 LBS | HEIGHT: 69 IN | RESPIRATION RATE: 20 BRPM | BODY MASS INDEX: 27.11 KG/M2 | DIASTOLIC BLOOD PRESSURE: 68 MMHG | HEART RATE: 51 BPM | SYSTOLIC BLOOD PRESSURE: 138 MMHG | OXYGEN SATURATION: 98 % | TEMPERATURE: 99 F

## 2022-07-15 DIAGNOSIS — K59.00 CONSTIPATION, UNSPECIFIED CONSTIPATION TYPE: ICD-10-CM

## 2022-07-15 DIAGNOSIS — R21 SCROTAL RASH: Primary | ICD-10-CM

## 2022-07-15 PROCEDURE — G8754 DIAS BP LESS 90: HCPCS | Performed by: FAMILY MEDICINE

## 2022-07-15 PROCEDURE — G8536 NO DOC ELDER MAL SCRN: HCPCS | Performed by: FAMILY MEDICINE

## 2022-07-15 PROCEDURE — 99213 OFFICE O/P EST LOW 20 MIN: CPT | Performed by: FAMILY MEDICINE

## 2022-07-15 PROCEDURE — G8427 DOCREV CUR MEDS BY ELIG CLIN: HCPCS | Performed by: FAMILY MEDICINE

## 2022-07-15 PROCEDURE — 1101F PT FALLS ASSESS-DOCD LE1/YR: CPT | Performed by: FAMILY MEDICINE

## 2022-07-15 PROCEDURE — G8752 SYS BP LESS 140: HCPCS | Performed by: FAMILY MEDICINE

## 2022-07-15 PROCEDURE — G8417 CALC BMI ABV UP PARAM F/U: HCPCS | Performed by: FAMILY MEDICINE

## 2022-07-15 PROCEDURE — G8510 SCR DEP NEG, NO PLAN REQD: HCPCS | Performed by: FAMILY MEDICINE

## 2022-07-15 PROCEDURE — 1123F ACP DISCUSS/DSCN MKR DOCD: CPT | Performed by: FAMILY MEDICINE

## 2022-07-15 RX ORDER — NYSTATIN 100000 [USP'U]/G
POWDER TOPICAL 4 TIMES DAILY
Qty: 60 G | Refills: 2 | Status: SHIPPED | OUTPATIENT
Start: 2022-07-15

## 2022-07-15 RX ORDER — POLYETHYLENE GLYCOL 3350 17 G/17G
17 POWDER, FOR SOLUTION ORAL DAILY
COMMUNITY

## 2022-07-15 RX ORDER — FLUCONAZOLE 100 MG/1
100 TABLET ORAL 2 TIMES DAILY
Qty: 28 TABLET | Refills: 0 | Status: SHIPPED | OUTPATIENT
Start: 2022-07-15 | End: 2022-07-29

## 2022-07-15 NOTE — PATIENT INSTRUCTIONS
Jock Itch: Care Instructions  Overview  Jock itch is a fungal infection of the groin. The fungus that causes jock itch lives on your skin. It often affects male athletes, but anyone can get jock itch. You may get an itchy rash on your inner thighs and rear end (buttocks). It spreads and starts to itch when you sweat or are in steamy showers or locker rooms. Jock itch should end soon if you keep your skin dry after you clean it. You can treat jock itch at home with antifungal creams that you can buy without a prescription. Follow-up care is a key part of your treatment and safety. Be sure to make and go to all appointments, and call your doctor if you are having problems. It's also a good idea to know your test results and keep a list of the medicines you take. How can you care for yourself at home? · Wash the rash with soap and water. Pat the skin dry. · Put a cool compress on the skin to relieve itching. · Spread antifungal cream over and around the entire edge of the rash. Follow the directions on the package. · To avoid spreading it, wash your hands well after treating or touching the rash. · If your doctor prescribed medicine, take it exactly as directed. Call your doctor if you have any problems with your medicine. · Try not to scratch the rash. · Shower or bathe daily and after you exercise. · Keep your skin dry as much as possible to allow it to heal.  · Until your jock itch is cured, wear loose-fitting cotton clothing. Avoid tight underwear, pants, and tights. · Wash your supporters and shorts after every wearing. · Do not share clothing, sports equipment, towels, or sheets to avoid spreading the fungi to other people. To prevent jock itch  · Put on socks before you put on underwear if you have athlete's foot. This action helps prevent the fungus on your feet from spreading to your groin. · Wash your workout clothes, underwear, socks, and towels after each use.   · Keep your groin, inner thighs, and buttocks clean and dry, especially after you exercise and shower. · Do not borrow or lend clothing, sports equipment, towels, or sheets. · Wear slippers or sandals in locker rooms, showers, and public bathing areas. When should you call for help? Call your doctor now or seek immediate medical care if:    · You have signs of infection, such as:  ? Increased pain, swelling, warmth, or redness. ? Red streaks leading from the rash. ? Pus draining from the rash. ? A fever. Watch closely for changes in your health, and be sure to contact your doctor if:    · You do not get better as expected. Where can you learn more? Go to http://www.gray.com/  Enter G303 in the search box to learn more about \"Jock Itch: Care Instructions. \"  Current as of: November 15, 2021               Content Version: 13.2  © 4954-8303 [employer]. Care instructions adapted under license by [employer] (which disclaims liability or warranty for this information). If you have questions about a medical condition or this instruction, always ask your healthcare professional. [place] [address] any warranty or liability for your use of this information.

## 2022-07-15 NOTE — PROGRESS NOTES
Identified pt with two pt identifiers(name and ). Chief Complaint   Patient presents with    Rash     Groin area for 3 weeks        Health Maintenance Due   Topic    Shingrix Vaccine Age 49> (1 of 2)    Pneumococcal 65+ years (2 - PCV)    Medicare Yearly Exam        Wt Readings from Last 3 Encounters:   07/15/22 183 lb (83 kg)   21 186 lb 9.6 oz (84.6 kg)   12/15/21 191 lb 3.2 oz (86.7 kg)     Temp Readings from Last 3 Encounters:   07/15/22 99 °F (37.2 °C) (Temporal)   21 98.8 °F (37.1 °C) (Oral)   12/15/21 98.1 °F (36.7 °C) (Oral)     BP Readings from Last 3 Encounters:   07/15/22 138/68   21 (!) 111/54   12/15/21 (!) 149/70     Pulse Readings from Last 3 Encounters:   07/15/22 (!) 51   21 60   12/15/21 (!) 59         Learning Assessment:  :     Learning Assessment 2021 12/15/2021 3/11/2015   PRIMARY LEARNER Patient Patient Patient   HIGHEST LEVEL OF EDUCATION - PRIMARY LEARNER  - - > 4 YEARS OF COLLEGE   BARRIERS PRIMARY LEARNER - - VISUAL   CO-LEARNER CAREGIVER No No No   PRIMARY LANGUAGE ENGLISH ENGLISH ENGLISH   LEARNER PREFERENCE PRIMARY LISTENING LISTENING LISTENING   ANSWERED BY patient patient self   RELATIONSHIP SELF SELF SELF       Depression Screening:  :     3 most recent PHQ Screens 7/15/2022   Little interest or pleasure in doing things Not at all   Feeling down, depressed, irritable, or hopeless Not at all   Total Score PHQ 2 0       Fall Risk Assessment:  :     Fall Risk Assessment, last 12 mths 2021   Able to walk? Yes   Fall in past 12 months? 0   Number of falls in past 12 months -   Fall with injury? -       Abuse Screening:  :     Abuse Screening Questionnaire 7/15/2022 12/15/2021 2021 2020 2019 10/2/2017 3/11/2015   Do you ever feel afraid of your partner? N N N N N N N   Are you in a relationship with someone who physically or mentally threatens you? N N N N N N N   Is it safe for you to go home?  Y Y Y Y Y Y Y       Coordination of Care Questionnaire:  :     1) Have you been to an emergency room, urgent care clinic since your last visit? no   Hospitalized since your last visit? no             2) Have you seen or consulted any other health care providers outside of [address] [place] [address] since your last visit? no [facility] for rash (Include any pap smears or colon screenings in this section.)    3) Do you have an Advance Directive on file? yes  Are you interested in receiving information about Advance Directives? no    Patient is accompanied by N/A I have received verbal consent from Markie Mae to discuss any/all medical information while they are present in the room. 4.  For patients aged 39-70: Has the patient had a colonoscopy / FIT/ Cologuard? NA - based on age      If the patient is female:    11. For patients aged 41-77: Has the patient had a mammogram within the past 2 years? NA - based on age or sex      10. For patients aged 21-65: Has the patient had a pap smear?  NA - based on age or sex

## 2022-07-17 NOTE — PROGRESS NOTES
HISTORY OF PRESENT ILLNESS  Melany Carrizales is a 81 y.o. male. HPI  C/O rash on scrotum and penis. Itchy. Started about 3 weeks ago. He went to see his PCP at [facility] 2 weeks ago and prescribed Fluconazole 100 mg BID x 2 weeks and Lidocaine 5 % oint. Rash improved but has not cleared completely. He had labs done at the [address] [place] [address] but no results sent to him. He also C/O constipation. Last BM was 4 days ago. Has taken some Miralax once daily x 3 days and ate prunes. Review of Systems   Skin: Positive for itching and rash. Neurological: Positive for headaches. All other systems reviewed and are negative. Visit Vitals  /68 (BP 1 Location: Left arm, BP Patient Position: Sitting, BP Cuff Size: Adult)   Pulse (!) 51   Temp 99 °F (37.2 °C) (Temporal)   Resp 20   Ht 5' 9\" (1.753 m)   Wt 183 lb (83 kg)   SpO2 98%   BMI 27.02 kg/m²       Physical Exam  Vitals reviewed. Genitourinary:         Comments: Minimal erythema of scrotum skin and shaft of penis. No papules or blisters. Neurological:      Mental Status: He is alert. ASSESSMENT and PLAN    ICD-10-CM ICD-9-CM    1. Scrotal rash  R21 782.1 fluconazole (DIFLUCAN) 100 mg tablet      nystatin (MYCOSTATIN) powder   2. Constipation, unspecified constipation type  K59.00 564.00      Increase water intake. Increase fiber in diet. May cont to take Miralax once daily. He may need to use a laxative such as Dulcolax short term. Patient Instructions          Jock Itch: Care Instructions  Overview  Jock itch is a fungal infection of the groin. The fungus that causes jock itch lives on your skin. It often affects male athletes, but anyone can get jock itch. You may get an itchy rash on your inner thighs and rear end (buttocks). It spreads and starts to itch when you sweat or are in steamy showers or locker rooms. Jock itch should end soon if you keep your skin dry after you clean it.  You can treat jock itch at home with antifungal creams that you can buy without a prescription. Follow-up care is a key part of your treatment and safety. Be sure to make and go to all appointments, and call your doctor if you are having problems. It's also a good idea to know your test results and keep a list of the medicines you take. How can you care for yourself at home? · Wash the rash with soap and water. Pat the skin dry. · Put a cool compress on the skin to relieve itching. · Spread antifungal cream over and around the entire edge of the rash. Follow the directions on the package. · To avoid spreading it, wash your hands well after treating or touching the rash. · If your doctor prescribed medicine, take it exactly as directed. Call your doctor if you have any problems with your medicine. · Try not to scratch the rash. · Shower or bathe daily and after you exercise. · Keep your skin dry as much as possible to allow it to heal.  · Until your jock itch is cured, wear loose-fitting cotton clothing. Avoid tight underwear, pants, and tights. · Wash your supporters and shorts after every wearing. · Do not share clothing, sports equipment, towels, or sheets to avoid spreading the fungi to other people. To prevent jock itch  · Put on socks before you put on underwear if you have athlete's foot. This action helps prevent the fungus on your feet from spreading to your groin. · Wash your workout clothes, underwear, socks, and towels after each use. · Keep your groin, inner thighs, and buttocks clean and dry, especially after you exercise and shower. · Do not borrow or lend clothing, sports equipment, towels, or sheets. · Wear slippers or sandals in locker rooms, showers, and public bathing areas. When should you call for help? Call your doctor now or seek immediate medical care if:    · You have signs of infection, such as:  ? Increased pain, swelling, warmth, or redness. ? Red streaks leading from the rash. ? Pus draining from the rash. ? A fever. Watch closely for changes in your health, and be sure to contact your doctor if:    · You do not get better as expected. Where can you learn more? Go to http://www.gray.com/  Enter G303 in the search box to learn more about \"Jock Itch: Care Instructions. \"  Current as of: November 15, 2021               Content Version: 13.2  © 7620-9685 [employer]. Care instructions adapted under license by [employer] (which disclaims liability or warranty for this information). If you have questions about a medical condition or this instruction, always ask your healthcare professional. [place] [address] any warranty or liability for your use of this information.

## 2022-08-12 ENCOUNTER — OFFICE VISIT (OUTPATIENT)
Dept: FAMILY MEDICINE CLINIC | Age: 82
End: 2022-08-12
Payer: MEDICARE

## 2022-08-12 VITALS
RESPIRATION RATE: 16 BRPM | TEMPERATURE: 97.8 F | HEIGHT: 69 IN | BODY MASS INDEX: 27.25 KG/M2 | OXYGEN SATURATION: 98 % | WEIGHT: 184 LBS | DIASTOLIC BLOOD PRESSURE: 68 MMHG | SYSTOLIC BLOOD PRESSURE: 106 MMHG | HEART RATE: 69 BPM

## 2022-08-12 DIAGNOSIS — B37.89 CANDIDA RASH OF GROIN: ICD-10-CM

## 2022-08-12 DIAGNOSIS — R32 URINARY INCONTINENCE, UNSPECIFIED TYPE: Primary | ICD-10-CM

## 2022-08-12 PROCEDURE — G8417 CALC BMI ABV UP PARAM F/U: HCPCS | Performed by: NURSE PRACTITIONER

## 2022-08-12 PROCEDURE — G8754 DIAS BP LESS 90: HCPCS | Performed by: NURSE PRACTITIONER

## 2022-08-12 PROCEDURE — G8432 DEP SCR NOT DOC, RNG: HCPCS | Performed by: NURSE PRACTITIONER

## 2022-08-12 PROCEDURE — G8427 DOCREV CUR MEDS BY ELIG CLIN: HCPCS | Performed by: NURSE PRACTITIONER

## 2022-08-12 PROCEDURE — 1101F PT FALLS ASSESS-DOCD LE1/YR: CPT | Performed by: NURSE PRACTITIONER

## 2022-08-12 PROCEDURE — G8752 SYS BP LESS 140: HCPCS | Performed by: NURSE PRACTITIONER

## 2022-08-12 PROCEDURE — 99213 OFFICE O/P EST LOW 20 MIN: CPT | Performed by: NURSE PRACTITIONER

## 2022-08-12 PROCEDURE — G8536 NO DOC ELDER MAL SCRN: HCPCS | Performed by: NURSE PRACTITIONER

## 2022-08-12 PROCEDURE — 1123F ACP DISCUSS/DSCN MKR DOCD: CPT | Performed by: NURSE PRACTITIONER

## 2022-08-12 NOTE — PROGRESS NOTES
Identified pt with two pt identifiers(name and ). Chief Complaint   Patient presents with    Rash     Follow up     Incontinence        Health Maintenance Due   Topic    Shingrix Vaccine Age 50> (1 of 2)    Pneumococcal 65+ years (2 - PCV)    Medicare Yearly Exam     COVID-19 Vaccine (4 - Booster for Pfizer series)       Wt Readings from Last 3 Encounters:   22 184 lb (83.5 kg)   07/15/22 183 lb (83 kg)   21 186 lb 9.6 oz (84.6 kg)     Temp Readings from Last 3 Encounters:   22 97.8 °F (36.6 °C) (Temporal)   07/15/22 99 °F (37.2 °C) (Temporal)   21 98.8 °F (37.1 °C) (Oral)     BP Readings from Last 3 Encounters:   22 106/68   07/15/22 138/68   21 (!) 111/54     Pulse Readings from Last 3 Encounters:   22 69   07/15/22 (!) 51   21 60         Learning Assessment:  :     Learning Assessment 2021 12/15/2021 3/11/2015   PRIMARY LEARNER Patient Patient Patient   HIGHEST LEVEL OF EDUCATION - PRIMARY LEARNER  - - > 4 YEARS OF COLLEGE   BARRIERS PRIMARY LEARNER - - VISUAL   CO-LEARNER CAREGIVER No No No   PRIMARY LANGUAGE ENGLISH ENGLISH ENGLISH   LEARNER PREFERENCE PRIMARY LISTENING LISTENING LISTENING   ANSWERED BY patient patient self   RELATIONSHIP SELF SELF SELF       Depression Screening:  :     3 most recent PHQ Screens 2022   Little interest or pleasure in doing things Not at all   Feeling down, depressed, irritable, or hopeless Not at all   Total Score PHQ 2 0       Fall Risk Assessment:  :     Fall Risk Assessment, last 12 mths 2021   Able to walk? Yes   Fall in past 12 months? 0   Number of falls in past 12 months -   Fall with injury? -       Abuse Screening:  :     Abuse Screening Questionnaire 2022 7/15/2022 12/15/2021 2021 2020 2019 10/2/2017   Do you ever feel afraid of your partner? N N N N N N N   Are you in a relationship with someone who physically or mentally threatens you?  N N N N N N N   Is it safe for you to go home? Y Y Y Y Y Y Y       Coordination of Care Questionnaire:  :     1) Have you been to an emergency room, urgent care clinic since your last visit? no   Hospitalized since your last visit? no             2) Have you seen or consulted any other health care providers outside of [address] [place] [address] since your last visit? no  (Include any pap smears or colon screenings in this section.)    3) Do you have an Advance Directive on file? yes  Are you interested in receiving information about Advance Directives? no    Patient is accompanied by N/A I have received verbal consent from Gilford Singh to discuss any/all medical information while they are present in the room. 4.  For patients aged 39-70: Has the patient had a colonoscopy / FIT/ Cologuard? NA - based on age      If the patient is female:    11. For patients aged 41-77: Has the patient had a mammogram within the past 2 years? NA - based on age or sex      10. For patients aged 21-65: Has the patient had a pap smear?  NA - based on age or sex

## 2022-08-12 NOTE — PROGRESS NOTES
HISTORY OF PRESENT ILLNESS  Wendi Quintero is a 81 y.o. male. HPI  Pt presents with \"rash in groin and incontinence\"  Pt states that if he uses the Nystatin that Dr Starr Kumar prescribed, the rash resolves. Rash is in groin, and red. He states that if he stops the nystatin, the rash seems to return. In addition he has noted urinary incontinence for about a month or so  Pt states that it is very mild, not full loss of bladder control. He states that this is new  No pain with urination  No blood in urine  Review of Systems   Constitutional:  Negative for fever. Skin:  Positive for rash. Physical Exam  Constitutional:       Appearance: Normal appearance. Cardiovascular:      Rate and Rhythm: Normal rate and regular rhythm. Heart sounds: Normal heart sounds. Pulmonary:      Effort: Pulmonary effort is normal.      Breath sounds: Normal breath sounds. Neurological:      Mental Status: He is alert. Psychiatric:         Mood and Affect: Mood normal.         Behavior: Behavior normal.       ASSESSMENT and PLAN    ICD-10-CM ICD-9-CM    1. Urinary incontinence, unspecified type  R32 788.30 REFERRAL TO UROLOGY      CULTURE, URINE      2. Candida rash of groin  B37.89 112.89       Educated about calling urology for appointment and assessment of incontinence    Educated that we will notify when urine culture returns, and inform him of any change in plan of care at that time. Can continue to use nystatin powder as needed for rash    Pt informed to return to office with worsening of symptoms, or PRN with any questions or concerns. Pt verbalizes understanding of plan of care and denies further questions or concerns at this time.

## 2023-08-14 ENCOUNTER — TELEPHONE (OUTPATIENT)
Age: 83
End: 2023-08-14

## 2023-08-15 RX ORDER — NYSTATIN 100000 [USP'U]/G
POWDER TOPICAL 4 TIMES DAILY
Qty: 60 G | Refills: 5 | Status: SHIPPED | OUTPATIENT
Start: 2023-08-15